# Patient Record
Sex: MALE | Race: WHITE | NOT HISPANIC OR LATINO | Employment: OTHER | RURAL
[De-identification: names, ages, dates, MRNs, and addresses within clinical notes are randomized per-mention and may not be internally consistent; named-entity substitution may affect disease eponyms.]

---

## 2017-01-04 RX ORDER — ISOSORBIDE MONONITRATE 30 MG/1
30 TABLET, EXTENDED RELEASE ORAL DAILY
Qty: 90 TABLET | Refills: 2 | Status: SHIPPED | OUTPATIENT
Start: 2017-01-04 | End: 2017-01-05 | Stop reason: SDUPTHER

## 2017-01-05 RX ORDER — NITROGLYCERIN 0.4 MG/1
0.4 TABLET SUBLINGUAL
Qty: 25 TABLET | Refills: 5 | Status: SHIPPED | OUTPATIENT
Start: 2017-01-05 | End: 2018-10-12 | Stop reason: SDUPTHER

## 2017-01-05 RX ORDER — ISOSORBIDE MONONITRATE 30 MG/1
30 TABLET, EXTENDED RELEASE ORAL DAILY
Qty: 90 TABLET | Refills: 2 | Status: SHIPPED | OUTPATIENT
Start: 2017-01-05 | End: 2017-10-16 | Stop reason: SDUPTHER

## 2017-03-09 PROBLEM — R07.89 ATYPICAL CHEST PAIN: Status: ACTIVE | Noted: 2017-03-09

## 2017-03-09 PROBLEM — Z72.0 TOBACCO USE: Status: ACTIVE | Noted: 2017-03-09

## 2017-03-27 RX ORDER — OMEPRAZOLE 20 MG/1
CAPSULE, DELAYED RELEASE ORAL
Qty: 90 CAPSULE | Refills: 0 | Status: SHIPPED | OUTPATIENT
Start: 2017-03-27 | End: 2017-06-26 | Stop reason: SDUPTHER

## 2017-05-18 ENCOUNTER — OFFICE VISIT (OUTPATIENT)
Dept: CARDIOLOGY | Facility: CLINIC | Age: 57
End: 2017-05-18

## 2017-05-18 VITALS
HEIGHT: 71 IN | DIASTOLIC BLOOD PRESSURE: 72 MMHG | SYSTOLIC BLOOD PRESSURE: 126 MMHG | HEART RATE: 70 BPM | WEIGHT: 190.4 LBS | BODY MASS INDEX: 26.65 KG/M2

## 2017-05-18 DIAGNOSIS — Z72.0 TOBACCO USE: ICD-10-CM

## 2017-05-18 DIAGNOSIS — I25.10 MULTIPLE VESSEL CORONARY ARTERY DISEASE: Primary | ICD-10-CM

## 2017-05-18 DIAGNOSIS — E78.5 DYSLIPIDEMIA: ICD-10-CM

## 2017-05-18 DIAGNOSIS — I10 ESSENTIAL HYPERTENSION: ICD-10-CM

## 2017-05-18 PROCEDURE — 99214 OFFICE O/P EST MOD 30 MIN: CPT | Performed by: NURSE PRACTITIONER

## 2017-05-18 RX ORDER — VARENICLINE TARTRATE 0.5 MG/1
0.5 TABLET, FILM COATED ORAL 2 TIMES DAILY
Qty: 30 TABLET | Refills: 0 | Status: SHIPPED | OUTPATIENT
Start: 2017-05-18 | End: 2017-12-07 | Stop reason: ALTCHOICE

## 2017-05-18 RX ORDER — VARENICLINE TARTRATE 1 MG/1
1 TABLET, FILM COATED ORAL 2 TIMES DAILY
Qty: 60 TABLET | Refills: 6 | Status: SHIPPED | OUTPATIENT
Start: 2017-05-18 | End: 2017-12-07 | Stop reason: ALTCHOICE

## 2017-05-19 ENCOUNTER — PREP FOR SURGERY (OUTPATIENT)
Dept: CARDIOLOGY | Facility: CLINIC | Age: 57
End: 2017-05-19

## 2017-05-19 DIAGNOSIS — I25.118 CORONARY ARTERY DISEASE INVOLVING NATIVE CORONARY ARTERY OF NATIVE HEART WITH OTHER FORM OF ANGINA PECTORIS (HCC): Primary | ICD-10-CM

## 2017-05-19 DIAGNOSIS — I25.119 CORONARY ARTERY DISEASE INVOLVING NATIVE CORONARY ARTERY OF NATIVE HEART WITH ANGINA PECTORIS (HCC): Primary | ICD-10-CM

## 2017-05-19 RX ORDER — ASPIRIN 81 MG/1
325 TABLET ORAL DAILY
Status: CANCELLED | OUTPATIENT
Start: 2017-05-20

## 2017-05-19 RX ORDER — NITROGLYCERIN 0.4 MG/1
0.4 TABLET SUBLINGUAL
Status: CANCELLED | OUTPATIENT
Start: 2017-05-19

## 2017-05-19 RX ORDER — ACETAMINOPHEN 325 MG/1
650 TABLET ORAL EVERY 4 HOURS PRN
Status: CANCELLED | OUTPATIENT
Start: 2017-05-19

## 2017-05-19 RX ORDER — SODIUM CHLORIDE 0.9 % (FLUSH) 0.9 %
1-10 SYRINGE (ML) INJECTION AS NEEDED
Status: CANCELLED | OUTPATIENT
Start: 2017-05-19

## 2017-05-19 RX ORDER — ASPIRIN 325 MG
325 TABLET ORAL ONCE
Status: CANCELLED | OUTPATIENT
Start: 2017-05-19 | End: 2017-05-19

## 2017-05-19 RX ORDER — ONDANSETRON 2 MG/ML
4 INJECTION INTRAMUSCULAR; INTRAVENOUS EVERY 6 HOURS PRN
Status: CANCELLED | OUTPATIENT
Start: 2017-05-19

## 2017-05-24 ENCOUNTER — RESULTS ENCOUNTER (OUTPATIENT)
Dept: CARDIOLOGY | Facility: CLINIC | Age: 57
End: 2017-05-24

## 2017-05-24 DIAGNOSIS — I25.119 CORONARY ARTERY DISEASE INVOLVING NATIVE CORONARY ARTERY OF NATIVE HEART WITH ANGINA PECTORIS (HCC): ICD-10-CM

## 2017-05-24 LAB
ALBUMIN SERPL-MCNC: 4.2 G/DL (ref 3.2–4.8)
ALBUMIN/GLOB SERPL: 1.4 G/DL (ref 1.5–2.5)
ALP SERPL-CCNC: 63 U/L (ref 25–100)
ALT SERPL-CCNC: 39 U/L (ref 7–40)
AST SERPL-CCNC: 37 U/L (ref 0–33)
BILIRUB SERPL-MCNC: 0.9 MG/DL (ref 0.3–1.2)
BUN SERPL-MCNC: 9 MG/DL (ref 9–23)
BUN/CREAT SERPL: 12.9 (ref 7–25)
CALCIUM SERPL-MCNC: 9.8 MG/DL (ref 8.7–10.4)
CHLORIDE SERPL-SCNC: 111 MMOL/L (ref 99–109)
CHOLEST SERPL-MCNC: 117 MG/DL (ref 0–200)
CO2 SERPL-SCNC: 28 MMOL/L (ref 20–31)
CREAT SERPL-MCNC: 0.7 MG/DL (ref 0.6–1.3)
ERYTHROCYTE [DISTWIDTH] IN BLOOD BY AUTOMATED COUNT: 12.5 % (ref 11.3–14.5)
GLOBULIN SER CALC-MCNC: 3 GM/DL
GLUCOSE SERPL-MCNC: 83 MG/DL (ref 70–100)
HCT VFR BLD AUTO: 43.5 % (ref 38.9–50.9)
HDLC SERPL-MCNC: 42 MG/DL (ref 40–60)
HGB BLD-MCNC: 14.8 G/DL (ref 13.1–17.5)
LDLC SERPL CALC-MCNC: 46 MG/DL (ref 0–100)
MCH RBC QN AUTO: 32.9 PG (ref 27–31)
MCHC RBC AUTO-ENTMCNC: 34 G/DL (ref 32–36)
MCV RBC AUTO: 96.7 FL (ref 80–99)
PLATELET # BLD AUTO: 236 10*3/MM3 (ref 150–450)
POTASSIUM SERPL-SCNC: 4 MMOL/L (ref 3.5–5.5)
PROT SERPL-MCNC: 7.2 G/DL (ref 5.7–8.2)
RBC # BLD AUTO: 4.5 10*6/MM3 (ref 4.2–5.76)
SODIUM SERPL-SCNC: 138 MMOL/L (ref 132–146)
TRIGL SERPL-MCNC: 145 MG/DL (ref 0–150)
VLDLC SERPL CALC-MCNC: 29 MG/DL
WBC # BLD AUTO: 7.65 10*3/MM3 (ref 3.5–10.8)

## 2017-05-24 RX ORDER — AMLODIPINE BESYLATE 5 MG/1
5 TABLET ORAL DAILY
Qty: 90 TABLET | Refills: 3 | Status: SHIPPED | OUTPATIENT
Start: 2017-05-24 | End: 2018-06-04 | Stop reason: SDUPTHER

## 2017-05-26 ENCOUNTER — HOSPITAL ENCOUNTER (OUTPATIENT)
Facility: HOSPITAL | Age: 57
Discharge: HOME | End: 2017-05-26
Attending: INTERNAL MEDICINE | Admitting: INTERNAL MEDICINE

## 2017-05-26 ENCOUNTER — APPOINTMENT (OUTPATIENT)
Dept: GENERAL RADIOLOGY | Facility: HOSPITAL | Age: 57
End: 2017-05-26

## 2017-05-26 VITALS
TEMPERATURE: 97.1 F | SYSTOLIC BLOOD PRESSURE: 124 MMHG | WEIGHT: 188.05 LBS | OXYGEN SATURATION: 94 % | RESPIRATION RATE: 18 BRPM | HEIGHT: 71 IN | DIASTOLIC BLOOD PRESSURE: 86 MMHG | HEART RATE: 59 BPM | BODY MASS INDEX: 26.33 KG/M2

## 2017-05-26 DIAGNOSIS — I25.10 MULTIPLE VESSEL CORONARY ARTERY DISEASE: ICD-10-CM

## 2017-05-26 DIAGNOSIS — I25.118 CORONARY ARTERY DISEASE INVOLVING NATIVE CORONARY ARTERY OF NATIVE HEART WITH OTHER FORM OF ANGINA PECTORIS (HCC): ICD-10-CM

## 2017-05-26 LAB
ACT BLD: 266 SECONDS (ref 82–152)
ACT BLD: 270 SECONDS (ref 82–152)

## 2017-05-26 PROCEDURE — C1725 CATH, TRANSLUMIN NON-LASER: HCPCS | Performed by: INTERNAL MEDICINE

## 2017-05-26 PROCEDURE — C1769 GUIDE WIRE: HCPCS | Performed by: INTERNAL MEDICINE

## 2017-05-26 PROCEDURE — C1894 INTRO/SHEATH, NON-LASER: HCPCS | Performed by: INTERNAL MEDICINE

## 2017-05-26 PROCEDURE — 25010000002 HEPARIN (PORCINE) PER 1000 UNITS: Performed by: INTERNAL MEDICINE

## 2017-05-26 PROCEDURE — 92928 PRQ TCAT PLMT NTRAC ST 1 LES: CPT | Performed by: INTERNAL MEDICINE

## 2017-05-26 PROCEDURE — 25010000002 FENTANYL CITRATE (PF) 100 MCG/2ML SOLUTION: Performed by: INTERNAL MEDICINE

## 2017-05-26 PROCEDURE — C1874 STENT, COATED/COV W/DEL SYS: HCPCS | Performed by: INTERNAL MEDICINE

## 2017-05-26 PROCEDURE — 93458 L HRT ARTERY/VENTRICLE ANGIO: CPT | Performed by: INTERNAL MEDICINE

## 2017-05-26 PROCEDURE — 0 IOPAMIDOL PER 1 ML: Performed by: INTERNAL MEDICINE

## 2017-05-26 PROCEDURE — C1753 CATH, INTRAVAS ULTRASOUND: HCPCS | Performed by: INTERNAL MEDICINE

## 2017-05-26 PROCEDURE — 25010000002 MIDAZOLAM PER 1 MG: Performed by: INTERNAL MEDICINE

## 2017-05-26 PROCEDURE — 71010 HC CHEST PA OR AP: CPT

## 2017-05-26 PROCEDURE — 85347 COAGULATION TIME ACTIVATED: CPT

## 2017-05-26 PROCEDURE — 92978 ENDOLUMINL IVUS OCT C 1ST: CPT | Performed by: INTERNAL MEDICINE

## 2017-05-26 PROCEDURE — C1887 CATHETER, GUIDING: HCPCS | Performed by: INTERNAL MEDICINE

## 2017-05-26 PROCEDURE — C9600 PERC DRUG-EL COR STENT SING: HCPCS | Performed by: INTERNAL MEDICINE

## 2017-05-26 DEVICE — XIENCE V EVEROLIMUS ELUTING CORONARY STENT SYSTEM  2.50 MM X 15 MM / RAPID-EXCHANGE
Type: IMPLANTABLE DEVICE | Status: FUNCTIONAL
Brand: XIENCE V

## 2017-05-26 RX ORDER — NITROGLYCERIN 0.4 MG/1
0.4 TABLET SUBLINGUAL
Status: DISCONTINUED | OUTPATIENT
Start: 2017-05-26 | End: 2017-05-26 | Stop reason: HOSPADM

## 2017-05-26 RX ORDER — NALOXONE HCL 0.4 MG/ML
0.4 VIAL (ML) INJECTION
Status: DISCONTINUED | OUTPATIENT
Start: 2017-05-26 | End: 2017-05-26 | Stop reason: HOSPADM

## 2017-05-26 RX ORDER — MIDAZOLAM HYDROCHLORIDE 1 MG/ML
INJECTION INTRAMUSCULAR; INTRAVENOUS AS NEEDED
Status: DISCONTINUED | OUTPATIENT
Start: 2017-05-26 | End: 2017-05-26 | Stop reason: HOSPADM

## 2017-05-26 RX ORDER — SODIUM CHLORIDE 9 MG/ML
1-3 INJECTION, SOLUTION INTRAVENOUS CONTINUOUS
Status: DISCONTINUED | OUTPATIENT
Start: 2017-05-26 | End: 2017-05-26 | Stop reason: HOSPADM

## 2017-05-26 RX ORDER — ASPIRIN 325 MG
325 TABLET, DELAYED RELEASE (ENTERIC COATED) ORAL DAILY
Status: DISCONTINUED | OUTPATIENT
Start: 2017-05-27 | End: 2017-05-26 | Stop reason: HOSPADM

## 2017-05-26 RX ORDER — CLOPIDOGREL BISULFATE 75 MG/1
75 TABLET ORAL DAILY
Status: DISCONTINUED | OUTPATIENT
Start: 2017-05-27 | End: 2017-05-26 | Stop reason: HOSPADM

## 2017-05-26 RX ORDER — TEMAZEPAM 7.5 MG/1
7.5 CAPSULE ORAL NIGHTLY PRN
Status: DISCONTINUED | OUTPATIENT
Start: 2017-05-26 | End: 2017-05-26 | Stop reason: HOSPADM

## 2017-05-26 RX ORDER — ASPIRIN 325 MG
325 TABLET, DELAYED RELEASE (ENTERIC COATED) ORAL DAILY
Status: DISCONTINUED | OUTPATIENT
Start: 2017-05-26 | End: 2017-05-26 | Stop reason: SDUPTHER

## 2017-05-26 RX ORDER — SENNA AND DOCUSATE SODIUM 50; 8.6 MG/1; MG/1
2 TABLET, FILM COATED ORAL NIGHTLY
Status: DISCONTINUED | OUTPATIENT
Start: 2017-05-26 | End: 2017-05-26 | Stop reason: HOSPADM

## 2017-05-26 RX ORDER — CLOPIDOGREL BISULFATE 75 MG/1
75 TABLET ORAL DAILY
Qty: 90 TABLET | Refills: 3 | Status: SHIPPED | OUTPATIENT
Start: 2017-05-26 | End: 2018-06-04 | Stop reason: SDUPTHER

## 2017-05-26 RX ORDER — DOCUSATE SODIUM 100 MG/1
100 CAPSULE, LIQUID FILLED ORAL 2 TIMES DAILY
Status: DISCONTINUED | OUTPATIENT
Start: 2017-05-26 | End: 2017-05-26 | Stop reason: HOSPADM

## 2017-05-26 RX ORDER — ASPIRIN 325 MG
325 TABLET ORAL ONCE
Status: COMPLETED | OUTPATIENT
Start: 2017-05-26 | End: 2017-05-26

## 2017-05-26 RX ORDER — HEPARIN SODIUM 1000 [USP'U]/ML
INJECTION, SOLUTION INTRAVENOUS; SUBCUTANEOUS AS NEEDED
Status: DISCONTINUED | OUTPATIENT
Start: 2017-05-26 | End: 2017-05-26 | Stop reason: HOSPADM

## 2017-05-26 RX ORDER — ONDANSETRON 2 MG/ML
4 INJECTION INTRAMUSCULAR; INTRAVENOUS EVERY 6 HOURS PRN
Status: DISCONTINUED | OUTPATIENT
Start: 2017-05-26 | End: 2017-05-26 | Stop reason: HOSPADM

## 2017-05-26 RX ORDER — HYDROCODONE BITARTRATE AND ACETAMINOPHEN 7.5; 325 MG/1; MG/1
1 TABLET ORAL EVERY 4 HOURS PRN
Status: DISCONTINUED | OUTPATIENT
Start: 2017-05-26 | End: 2017-05-26 | Stop reason: HOSPADM

## 2017-05-26 RX ORDER — SODIUM CHLORIDE 9 MG/ML
100 INJECTION, SOLUTION INTRAVENOUS CONTINUOUS
Status: ACTIVE | OUTPATIENT
Start: 2017-05-26 | End: 2017-05-26

## 2017-05-26 RX ORDER — LIDOCAINE HYDROCHLORIDE 10 MG/ML
INJECTION, SOLUTION INFILTRATION; PERINEURAL AS NEEDED
Status: DISCONTINUED | OUTPATIENT
Start: 2017-05-26 | End: 2017-05-26 | Stop reason: HOSPADM

## 2017-05-26 RX ORDER — FENTANYL CITRATE 50 UG/ML
INJECTION, SOLUTION INTRAMUSCULAR; INTRAVENOUS AS NEEDED
Status: DISCONTINUED | OUTPATIENT
Start: 2017-05-26 | End: 2017-05-26 | Stop reason: HOSPADM

## 2017-05-26 RX ORDER — BISACODYL 10 MG
10 SUPPOSITORY, RECTAL RECTAL DAILY PRN
Status: DISCONTINUED | OUTPATIENT
Start: 2017-05-26 | End: 2017-05-26 | Stop reason: HOSPADM

## 2017-05-26 RX ORDER — ACETAMINOPHEN 325 MG/1
650 TABLET ORAL EVERY 4 HOURS PRN
Status: DISCONTINUED | OUTPATIENT
Start: 2017-05-26 | End: 2017-05-26 | Stop reason: HOSPADM

## 2017-05-26 RX ORDER — SODIUM CHLORIDE 0.9 % (FLUSH) 0.9 %
1-10 SYRINGE (ML) INJECTION AS NEEDED
Status: DISCONTINUED | OUTPATIENT
Start: 2017-05-26 | End: 2017-05-26 | Stop reason: HOSPADM

## 2017-05-26 RX ORDER — CLOPIDOGREL BISULFATE 75 MG/1
600 TABLET ORAL ONCE
Status: DISCONTINUED | OUTPATIENT
Start: 2017-05-26 | End: 2017-05-26 | Stop reason: HOSPADM

## 2017-05-26 RX ORDER — CLOPIDOGREL BISULFATE 75 MG/1
TABLET ORAL AS NEEDED
Status: DISCONTINUED | OUTPATIENT
Start: 2017-05-26 | End: 2017-05-26 | Stop reason: HOSPADM

## 2017-05-26 RX ORDER — POLYETHYLENE GLYCOL 3350 17 G/17G
17 POWDER, FOR SOLUTION ORAL DAILY
Status: DISCONTINUED | OUTPATIENT
Start: 2017-05-26 | End: 2017-05-26 | Stop reason: HOSPADM

## 2017-05-26 RX ORDER — MORPHINE SULFATE 4 MG/ML
1 INJECTION, SOLUTION INTRAMUSCULAR; INTRAVENOUS EVERY 4 HOURS PRN
Status: DISCONTINUED | OUTPATIENT
Start: 2017-05-26 | End: 2017-05-26 | Stop reason: HOSPADM

## 2017-05-26 RX ADMIN — ASPIRIN 325 MG ORAL TABLET 325 MG: 325 PILL ORAL at 06:59

## 2017-05-26 RX ADMIN — SODIUM CHLORIDE 3 ML/KG/HR: 9 INJECTION, SOLUTION INTRAVENOUS at 07:01

## 2017-05-31 ENCOUNTER — OFFICE VISIT (OUTPATIENT)
Dept: INTERNAL MEDICINE | Facility: CLINIC | Age: 57
End: 2017-05-31

## 2017-05-31 VITALS
BODY MASS INDEX: 26.74 KG/M2 | HEART RATE: 76 BPM | DIASTOLIC BLOOD PRESSURE: 70 MMHG | WEIGHT: 191 LBS | TEMPERATURE: 98 F | SYSTOLIC BLOOD PRESSURE: 115 MMHG | HEIGHT: 71 IN | OXYGEN SATURATION: 96 %

## 2017-05-31 DIAGNOSIS — F41.9 ANXIETY: Primary | ICD-10-CM

## 2017-05-31 DIAGNOSIS — I25.10 MULTIPLE VESSEL CORONARY ARTERY DISEASE: ICD-10-CM

## 2017-05-31 DIAGNOSIS — K21.9 GASTROESOPHAGEAL REFLUX DISEASE, ESOPHAGITIS PRESENCE NOT SPECIFIED: ICD-10-CM

## 2017-05-31 DIAGNOSIS — I10 ESSENTIAL HYPERTENSION: ICD-10-CM

## 2017-05-31 PROBLEM — R07.89 ATYPICAL CHEST PAIN: Status: RESOLVED | Noted: 2017-03-09 | Resolved: 2017-05-31

## 2017-05-31 PROCEDURE — 96160 PT-FOCUSED HLTH RISK ASSMT: CPT | Performed by: INTERNAL MEDICINE

## 2017-05-31 PROCEDURE — 99396 PREV VISIT EST AGE 40-64: CPT | Performed by: INTERNAL MEDICINE

## 2017-05-31 PROCEDURE — G0439 PPPS, SUBSEQ VISIT: HCPCS | Performed by: INTERNAL MEDICINE

## 2017-05-31 RX ORDER — ESCITALOPRAM OXALATE 10 MG/1
10 TABLET ORAL DAILY
Qty: 30 TABLET | Refills: 2 | Status: SHIPPED | OUTPATIENT
Start: 2017-05-31 | End: 2017-07-04 | Stop reason: SDUPTHER

## 2017-06-26 ENCOUNTER — OFFICE VISIT (OUTPATIENT)
Dept: INTERNAL MEDICINE | Facility: CLINIC | Age: 57
End: 2017-06-26

## 2017-06-26 VITALS
DIASTOLIC BLOOD PRESSURE: 71 MMHG | OXYGEN SATURATION: 96 % | WEIGHT: 189.13 LBS | SYSTOLIC BLOOD PRESSURE: 130 MMHG | HEIGHT: 71 IN | TEMPERATURE: 98.2 F | BODY MASS INDEX: 26.48 KG/M2 | HEART RATE: 69 BPM

## 2017-06-26 DIAGNOSIS — G47.09 OTHER INSOMNIA: ICD-10-CM

## 2017-06-26 DIAGNOSIS — F41.9 ANXIETY: Primary | ICD-10-CM

## 2017-06-26 DIAGNOSIS — I25.10 MULTIPLE VESSEL CORONARY ARTERY DISEASE: ICD-10-CM

## 2017-06-26 DIAGNOSIS — I10 ESSENTIAL HYPERTENSION: ICD-10-CM

## 2017-06-26 PROCEDURE — 99214 OFFICE O/P EST MOD 30 MIN: CPT | Performed by: INTERNAL MEDICINE

## 2017-06-26 RX ORDER — OMEPRAZOLE 20 MG/1
CAPSULE, DELAYED RELEASE ORAL
Qty: 30 CAPSULE | Refills: 0 | Status: SHIPPED | OUTPATIENT
Start: 2017-06-26 | End: 2017-07-04 | Stop reason: SDUPTHER

## 2017-06-26 RX ORDER — TEMAZEPAM 15 MG/1
15 CAPSULE ORAL NIGHTLY PRN
Qty: 30 CAPSULE | Refills: 3 | Status: SHIPPED | OUTPATIENT
Start: 2017-06-26 | End: 2017-09-28 | Stop reason: SDUPTHER

## 2017-06-26 NOTE — PROGRESS NOTES
Subjective  Johnny Crow is a 57 y.o. male    HPI coming in for follow-up patient with history of coronary artery disease has significantly cut down on his tobacco use history of anxiety with depression for which we had put him on Lexapro he appears to be tolerating this well and appears to be doing significantly better.  history of insomnia. May drink up to 5 beers in the evening    The following portions of the patient's history were reviewed and updated as appropriate: allergies, current medications, past family history, past medical history, past social history, past surgical history, and problem list.     Review of Systems   Constitutional: Negative.  Negative for activity change, appetite change, fatigue and fever.   HENT: Negative for congestion, ear discharge, ear pain and trouble swallowing.    Eyes: Negative for photophobia and visual disturbance.   Respiratory: Positive for cough. Negative for shortness of breath.    Cardiovascular: Negative for chest pain and palpitations.   Gastrointestinal: Negative for abdominal distention, abdominal pain, constipation, diarrhea, nausea and vomiting.   Endocrine: Negative.    Genitourinary: Negative for dysuria, hematuria and urgency.   Musculoskeletal: Negative for arthralgias, back pain, joint swelling and myalgias.   Skin: Negative for color change and rash.   Allergic/Immunologic: Negative.    Neurological: Negative for dizziness, weakness, light-headedness and headaches.   Hematological: Negative for adenopathy. Does not bruise/bleed easily.   Psychiatric/Behavioral: Positive for sleep disturbance. Negative for agitation, confusion and dysphoric mood. The patient is nervous/anxious.        Vitals:    06/26/17 1030   BP: 130/71   Pulse: 69   Temp: 98.2 °F (36.8 °C)   SpO2: 96%       Objective  Physical Exam   Constitutional: He is oriented to person, place, and time. He appears well-developed and well-nourished. No distress.   HENT:   Nose: Nose normal.    Mouth/Throat: Oropharynx is clear and moist.   Eyes: Conjunctivae and EOM are normal. No scleral icterus.   Neck: No tracheal deviation present. No thyromegaly present.   Cardiovascular: Normal rate and regular rhythm.  Exam reveals no friction rub.    No murmur heard.  Pulmonary/Chest: No respiratory distress. He has no wheezes. He has no rales.   Abdominal: Soft. He exhibits no distension and no mass. There is no tenderness. There is no guarding.   Musculoskeletal: Normal range of motion. He exhibits no deformity.   Lymphadenopathy:     He has no cervical adenopathy.   Neurological: He is alert and oriented to person, place, and time. He has normal reflexes. No cranial nerve deficit. Coordination normal.   Skin: Skin is warm and dry. No rash noted. No erythema.   Psychiatric: He has a normal mood and affect. His behavior is normal. Judgment and thought content normal.       Diagnoses and all orders for this visit:    Anxiety better counseled about sleep hygiene advised to cut down on alcohol use    Essential hypertension stable with current meds and low-salt diet    Multiple vessel coronary artery disease angina free counseled about complete cessation of tobacco continue with him door along with statins  Temazepam when necessary at nighttime counseled about addictive nature of the medication

## 2017-06-29 RX ORDER — SIMVASTATIN 80 MG
TABLET ORAL
Qty: 90 TABLET | Refills: 1 | Status: SHIPPED | OUTPATIENT
Start: 2017-06-29 | End: 2017-11-17 | Stop reason: SDUPTHER

## 2017-07-05 RX ORDER — ESCITALOPRAM OXALATE 10 MG/1
TABLET ORAL
Qty: 30 TABLET | Refills: 5 | Status: SHIPPED | OUTPATIENT
Start: 2017-07-05 | End: 2018-01-04 | Stop reason: SDUPTHER

## 2017-07-06 RX ORDER — OMEPRAZOLE 20 MG/1
CAPSULE, DELAYED RELEASE ORAL
Qty: 90 CAPSULE | Refills: 2 | Status: SHIPPED | OUTPATIENT
Start: 2017-07-06 | End: 2018-05-03 | Stop reason: SDUPTHER

## 2017-07-18 ENCOUNTER — OFFICE VISIT (OUTPATIENT)
Dept: CARDIOLOGY | Facility: CLINIC | Age: 57
End: 2017-07-18

## 2017-07-18 VITALS
WEIGHT: 188 LBS | BODY MASS INDEX: 26.32 KG/M2 | DIASTOLIC BLOOD PRESSURE: 70 MMHG | SYSTOLIC BLOOD PRESSURE: 144 MMHG | HEART RATE: 64 BPM | HEIGHT: 71 IN

## 2017-07-18 DIAGNOSIS — I25.10 MULTIPLE VESSEL CORONARY ARTERY DISEASE: Primary | ICD-10-CM

## 2017-07-18 DIAGNOSIS — I10 ESSENTIAL HYPERTENSION: ICD-10-CM

## 2017-07-18 DIAGNOSIS — E78.5 DYSLIPIDEMIA: ICD-10-CM

## 2017-07-18 PROCEDURE — 99213 OFFICE O/P EST LOW 20 MIN: CPT | Performed by: INTERNAL MEDICINE

## 2017-07-18 NOTE — PROGRESS NOTES
Johnny Crow  1960  [phone]      07/18/2017    Sagar Cid MD    Chief Complaint   Patient presents with   • Coronary Artery Disease   • Hypertension     PROBLEM LIST:  1. Coronary artery disease:  a. Non-ST-elevation MI, 09/11/2006.  b. LHC, 09/12/2006, with primary stenting of the proximal and mid  right coronary artery using a 2.5 X 18 mm drug-eluting Cypher stent; 80% circumflex obtuse marginal subsegment ostial stenosis too small for intervention; 50% mid to distal circumflex stenosis; LAD maximal stenosis of 50%.   c. Cardiolite GXT, 06/26/2008:   Abnormal Cardiolite GXT with an area of stress-induced basal and mid posterolateral hypoperfusion.  LVEF 50% with mild inferobasilar hypokinesis.    d. Left heart catheterization, 06/27/2008, Dr. Irwin Nascimento:  LVEF 55% to 60% with total occlusion of  a small superior branch of the first OM with late filling, nonobstructive coronary disease in the first diagonal.   e. Repeat Cardiolite GXT for chest pain, 01/23/2009:  Exercise duration of 12 minutes with no chest discomfort.  An area of mild stress-induced  basal and mid posterolateral hypoperfusion was seen identical to the previous findings from June of 2008 at which time cardiac catheterization showed no significant findings.  LVEF 56%.  f. Echocardiogram by Dr. Calvillo, 12/16/2009: Exercise duration  of 12 minutes and 20 seconds with a maximal systolic pressure of 202 mmHg and mid and basilar septal and inferior hypokinesis on post exercise images.  g. Left heart catheterization, 05/07/2010:  40% RCA stenosis with an occluded third obtuse marginal  branch felt responsible for the patient’s abnormal stress test.  Normal LV systolic function.  h. Recurrent angina with negative gallbladder ultrasound, December 2012.  i. Cardiac catheterization, 12/21/2012, Anai Sweet MD, revealing  a widely patent RCA stent, normal LV systolic function and wall motion, and a 40% to 50% stenosis of the  second diagonal branch.  j. Cardiac catheterization, 5/26/2017: Successful but complex PTCA/stent placement in the ostial circumflex using a 2.5 x 15 mm Alpine drug-eluting stent. Residual diagonal disease, occluded obtuse marginal which is an old finding. Patent right coronary with disease distal to the right coronary which does not appear critical. Mildly reduced LV systolic function with lateral hypokinesis prior to intervention.  2. Tobacco use with cessation, 07/07/2014.  3. Dyslipidemia.   4. Hypertension.  5. Leg pain:  a.  Normal KIMMY, 09/13/2006, at [facility].    6. Status  post polypectomy x2, noncancerous, spring 2006.  7. Spontaneous pneumothorax x2.   8. Bilateral lung nodules, left greater than right, currently under treatment by the [facility].    No Known Allergies    Current Medications:      Current Outpatient Prescriptions:   •  amLODIPine (NORVASC) 5 MG tablet, Take 1 tablet by mouth Daily., Disp: 90 tablet, Rfl: 3  •  aspirin 325 MG tablet, Take 1 tablet by mouth daily., Disp: , Rfl:   •  clopidogrel (PLAVIX) 75 MG tablet, Take 1 tablet by mouth Daily., Disp: 90 tablet, Rfl: 3  •  escitalopram (LEXAPRO) 10 MG tablet, take 1 tablet by mouth once daily, Disp: 30 tablet, Rfl: 5  •  isosorbide mononitrate (IMDUR) 30 MG 24 hr tablet, Take 1 tablet by mouth Daily., Disp: 90 tablet, Rfl: 2  •  metoprolol succinate XL (TOPROL-XL) 50 MG 24 hr tablet, Take 1 tablet by mouth Daily., Disp: 90 tablet, Rfl: 2  •  nicotine (NICOTROL) 10 MG inhaler, Inhale 1 puff As Needed for Smoking Cessation., Disp: 168 each, Rfl: 0  •  nitroglycerin (NITROSTAT) 0.4 MG SL tablet, Place 1 tablet under the tongue Every 5 (Five) Minutes As Needed for chest pain., Disp: 25 tablet, Rfl: 5  •  omeprazole (priLOSEC) 20 MG capsule, take 1 capsule by mouth once daily, Disp: 90 capsule, Rfl: 2  •  simvastatin (ZOCOR) 80 MG tablet, take 1 tablet by mouth once daily, Disp: 90 tablet, Rfl: 1  •  temazepam (RESTORIL) 15 MG  "capsule, Take 1 capsule by mouth At Night As Needed for Sleep., Disp: 30 capsule, Rfl: 3  •  varenicline (CHANTIX CONTINUING MONTH [place]) 1 MG tablet, Take 1 tablet by mouth 2 (Two) Times a Day., Disp: 60 tablet, Rfl: 6  •  varenicline (CHANTIX) 0.5 MG tablet, Take 1 tablet by mouth 2 (Two) Times a Day., Disp: 30 tablet, Rfl: 0    HPI    Mr. Crow presents today for 6 week hospital follow up status post cardiac catheterization. Patient has a history of coronary artery disease, hypertension, and dyslipidemia. Since last visit, patient has been feeling feeling well overall from a cardiovascular standpoint. He notes that the first week after the cath, he was very weak. After that, he states he went on a few 4 day golf trips, playing 36 holes per day, and did so with no issues. He monitors his blood pressure at home and notes it has been running within normal limits. Patient denies chest pain, palpitations, shortness of breath, edema, PND, orthopnea, dizziness, and syncope. He has not ceased smoking yet but is actively trying to stop. He is currently not doing much in terms of exercise, but is trying to remain active doing things such as playing golf and working in the yard.    The following portions of the patient's history were reviewed and updated as appropriate: allergies, current medications and problem list.    Pertinent positives as listed in the HPI.  All other systems reviewed are negative.    Vitals:    07/18/17 1044   BP: 144/70   BP Location: Left arm   Patient Position: Sitting   Pulse: 64   Weight: 188 lb (85.3 kg)   Height: 71\" (180.3 cm)       Physical Exam:  General: Alert and oriented  Neck: Jugular venous pressure is within normal limits. Carotids have normal upstrokes without bruits.   Cardiovascular: Regular rate and rhythm without murmur gallop or rub.  Lungs: Clear without rales or wheezes. Equal expansion is noted.   Extremities: Show no edema.  Skin: warm and dry.  Neurologic: " nonfocal    Diagnostic Data:    Lab Results   Component Value Date    TRIG 145 05/23/2017    HDL 42 05/23/2017    LDLDIRECT 65 11/12/2015    AST 37 (H) 05/23/2017    ALT 39 05/23/2017     Lab Results   Component Value Date    GLUCOSE 96 08/30/2016    BUN 9 05/23/2017    CREATININE 0.70 05/23/2017    EGFRIFNONA 117 05/23/2017    EGFRIFAFRI 141 05/23/2017    BCR 12.9 05/23/2017    K 4.0 05/23/2017    CO2 28.0 05/23/2017    CALCIUM 9.8 05/23/2017    PROTENTOTREF 7.2 05/23/2017    ALBUMIN 4.20 05/23/2017    LABIL2 1.4 (L) 05/23/2017    AST 37 (H) 05/23/2017    ALT 39 05/23/2017     Lab Results   Component Value Date    WBC 7.65 05/23/2017    HGB 14.8 05/23/2017    HCT 43.5 05/23/2017    MCV 96.7 05/23/2017     05/23/2017       Procedures    Assessment:      ICD-10-CM ICD-9-CM   1. Multiple vessel coronary artery disease I25.10 414.00   2. Essential hypertension I10 401.9   3. Dyslipidemia E78.5 272.4       Plan:    1. Continue efforts at smoking cessation.   2. Begin routine exercise regimen; 4-5 days per week, 30 minutes at a time.  3. Continue current medications.  Plavix for 1 yr p stent placement.  4. F/up in 4 months or sooner if needed.    Scribed for Anai Sweet MD by Jaime Cardenas. 7/18/2017  11:05 AM     I Anai Sweet MD personally performed the services described in this documentation as scribed by the above individual in my presence, and it is both accurate and complete.    Anai Sweet MD, FACC

## 2017-08-21 RX ORDER — METOPROLOL SUCCINATE 50 MG/1
TABLET, EXTENDED RELEASE ORAL
Qty: 90 TABLET | Refills: 3 | Status: SHIPPED | OUTPATIENT
Start: 2017-08-21 | End: 2018-02-15 | Stop reason: SDUPTHER

## 2017-09-04 ENCOUNTER — HOSPITAL ENCOUNTER (EMERGENCY)
Facility: HOSPITAL | Age: 57
Discharge: HOME | End: 2017-09-04
Attending: EMERGENCY MEDICINE | Admitting: EMERGENCY MEDICINE

## 2017-09-04 VITALS
SYSTOLIC BLOOD PRESSURE: 132 MMHG | HEIGHT: 71 IN | HEART RATE: 70 BPM | WEIGHT: 186 LBS | OXYGEN SATURATION: 93 % | TEMPERATURE: 98 F | BODY MASS INDEX: 26.04 KG/M2 | DIASTOLIC BLOOD PRESSURE: 72 MMHG | RESPIRATION RATE: 20 BRPM

## 2017-09-04 DIAGNOSIS — E86.0 DEHYDRATION: ICD-10-CM

## 2017-09-04 DIAGNOSIS — R74.8 ELEVATED LIPASE: ICD-10-CM

## 2017-09-04 DIAGNOSIS — F10.10 ALCOHOL ABUSE: Primary | ICD-10-CM

## 2017-09-04 LAB
ALBUMIN SERPL-MCNC: 4.2 G/DL (ref 3.5–5)
ALBUMIN/GLOB SERPL: 1.2 G/DL (ref 1–2)
ALP SERPL-CCNC: 71 U/L (ref 38–126)
ALT SERPL W P-5'-P-CCNC: 62 U/L (ref 13–69)
ANION GAP SERPL CALCULATED.3IONS-SCNC: 13.9 MMOL/L
AST SERPL-CCNC: 83 U/L (ref 15–46)
BASOPHILS # BLD AUTO: 0.06 10*3/MM3 (ref 0–0.2)
BASOPHILS NFR BLD AUTO: 0.9 % (ref 0–2.5)
BILIRUB SERPL-MCNC: 0.5 MG/DL (ref 0.2–1.3)
BILIRUB UR QL STRIP: NEGATIVE
BUN BLD-MCNC: 5 MG/DL (ref 7–20)
BUN/CREAT SERPL: 8.3 (ref 6.3–21.9)
CALCIUM SPEC-SCNC: 9.4 MG/DL (ref 8.4–10.2)
CHLORIDE SERPL-SCNC: 108 MMOL/L (ref 98–107)
CK SERPL-CCNC: 2022 U/L (ref 30–170)
CLARITY UR: CLEAR
CO2 SERPL-SCNC: 25 MMOL/L (ref 26–30)
COLOR UR: YELLOW
CREAT BLD-MCNC: 0.6 MG/DL (ref 0.6–1.3)
DEPRECATED RDW RBC AUTO: 41.6 FL (ref 37–54)
EOSINOPHIL # BLD AUTO: 0.13 10*3/MM3 (ref 0–0.7)
EOSINOPHIL NFR BLD AUTO: 1.9 % (ref 0–7)
ERYTHROCYTE [DISTWIDTH] IN BLOOD BY AUTOMATED COUNT: 12 % (ref 11.5–14.5)
GFR SERPL CREATININE-BSD FRML MDRD: 139 ML/MIN/1.73
GLOBULIN UR ELPH-MCNC: 3.4 GM/DL
GLUCOSE BLD-MCNC: 107 MG/DL (ref 74–98)
GLUCOSE UR STRIP-MCNC: ABNORMAL MG/DL
HCT VFR BLD AUTO: 40.6 % (ref 42–52)
HGB BLD-MCNC: 14.3 G/DL (ref 14–18)
HGB UR QL STRIP.AUTO: NEGATIVE
HOLD SPECIMEN: NORMAL
HOLD SPECIMEN: NORMAL
IMM GRANULOCYTES # BLD: 0.02 10*3/MM3 (ref 0–0.06)
IMM GRANULOCYTES NFR BLD: 0.3 % (ref 0–0.6)
KETONES UR QL STRIP: NEGATIVE
LEUKOCYTE ESTERASE UR QL STRIP.AUTO: NEGATIVE
LIPASE SERPL-CCNC: 364 U/L (ref 23–300)
LYMPHOCYTES # BLD AUTO: 1.53 10*3/MM3 (ref 0.6–3.4)
LYMPHOCYTES NFR BLD AUTO: 22.1 % (ref 10–50)
MCH RBC QN AUTO: 33 PG (ref 27–31)
MCHC RBC AUTO-ENTMCNC: 35.2 G/DL (ref 30–37)
MCV RBC AUTO: 93.8 FL (ref 80–94)
MONOCYTES # BLD AUTO: 0.57 10*3/MM3 (ref 0–0.9)
MONOCYTES NFR BLD AUTO: 8.2 % (ref 0–12)
NEUTROPHILS # BLD AUTO: 4.61 10*3/MM3 (ref 2–6.9)
NEUTROPHILS NFR BLD AUTO: 66.6 % (ref 37–80)
NITRITE UR QL STRIP: NEGATIVE
NRBC BLD MANUAL-RTO: 0 /100 WBC (ref 0–0)
PH UR STRIP.AUTO: 6.5 [PH] (ref 5–8)
PLATELET # BLD AUTO: 230 10*3/MM3 (ref 130–400)
PMV BLD AUTO: 9.4 FL (ref 6–12)
POTASSIUM BLD-SCNC: 3.9 MMOL/L (ref 3.5–5.1)
PROT SERPL-MCNC: 7.6 G/DL (ref 6.3–8.2)
PROT UR QL STRIP: NEGATIVE
RBC # BLD AUTO: 4.33 10*6/MM3 (ref 4.7–6.1)
SODIUM BLD-SCNC: 143 MMOL/L (ref 137–145)
SP GR UR STRIP: <=1.005 (ref 1–1.03)
UROBILINOGEN UR QL STRIP: ABNORMAL
WBC NRBC COR # BLD: 6.92 10*3/MM3 (ref 4.8–10.8)
WHOLE BLOOD HOLD SPECIMEN: NORMAL
WHOLE BLOOD HOLD SPECIMEN: NORMAL

## 2017-09-04 PROCEDURE — 85025 COMPLETE CBC W/AUTO DIFF WBC: CPT | Performed by: EMERGENCY MEDICINE

## 2017-09-04 PROCEDURE — 96374 THER/PROPH/DIAG INJ IV PUSH: CPT

## 2017-09-04 PROCEDURE — 80053 COMPREHEN METABOLIC PANEL: CPT | Performed by: EMERGENCY MEDICINE

## 2017-09-04 PROCEDURE — 99284 EMERGENCY DEPT VISIT MOD MDM: CPT

## 2017-09-04 PROCEDURE — 25010000002 DIAZEPAM PER 5 MG: Performed by: EMERGENCY MEDICINE

## 2017-09-04 PROCEDURE — 82550 ASSAY OF CK (CPK): CPT | Performed by: EMERGENCY MEDICINE

## 2017-09-04 PROCEDURE — 96361 HYDRATE IV INFUSION ADD-ON: CPT

## 2017-09-04 PROCEDURE — 81003 URINALYSIS AUTO W/O SCOPE: CPT

## 2017-09-04 PROCEDURE — 83690 ASSAY OF LIPASE: CPT | Performed by: EMERGENCY MEDICINE

## 2017-09-04 RX ORDER — SODIUM CHLORIDE, SODIUM LACTATE, POTASSIUM CHLORIDE, CALCIUM CHLORIDE 600; 310; 30; 20 MG/100ML; MG/100ML; MG/100ML; MG/100ML
1000 INJECTION, SOLUTION INTRAVENOUS ONCE
Status: COMPLETED | OUTPATIENT
Start: 2017-09-04 | End: 2017-09-04

## 2017-09-04 RX ORDER — DIAZEPAM 5 MG/ML
5 INJECTION, SOLUTION INTRAMUSCULAR; INTRAVENOUS ONCE
Status: COMPLETED | OUTPATIENT
Start: 2017-09-04 | End: 2017-09-04

## 2017-09-04 RX ORDER — HYDROCODONE BITARTRATE AND ACETAMINOPHEN 5; 325 MG/1; MG/1
1 TABLET ORAL EVERY 6 HOURS PRN
Qty: 10 TABLET | Refills: 0 | Status: SHIPPED | OUTPATIENT
Start: 2017-09-04 | End: 2017-12-07 | Stop reason: ALTCHOICE

## 2017-09-04 RX ORDER — ONDANSETRON 4 MG/1
4 TABLET, FILM COATED ORAL EVERY 6 HOURS PRN
Qty: 10 TABLET | Refills: 0 | Status: SHIPPED | OUTPATIENT
Start: 2017-09-04 | End: 2017-12-07 | Stop reason: ALTCHOICE

## 2017-09-04 RX ORDER — SODIUM CHLORIDE 0.9 % (FLUSH) 0.9 %
10 SYRINGE (ML) INJECTION AS NEEDED
Status: DISCONTINUED | OUTPATIENT
Start: 2017-09-04 | End: 2017-09-04 | Stop reason: HOSPADM

## 2017-09-04 RX ADMIN — SODIUM CHLORIDE 1000 ML: 9 INJECTION, SOLUTION INTRAVENOUS at 15:26

## 2017-09-04 RX ADMIN — DIAZEPAM 5 MG: 5 INJECTION, SOLUTION INTRAMUSCULAR; INTRAVENOUS at 15:27

## 2017-09-04 RX ADMIN — SODIUM CHLORIDE, POTASSIUM CHLORIDE, SODIUM LACTATE AND CALCIUM CHLORIDE 1000 ML: 600; 310; 30; 20 INJECTION, SOLUTION INTRAVENOUS at 16:45

## 2017-09-04 NOTE — ED PROVIDER NOTES
"Subjective   HPI Comments: 57-year-old male presenting with \"I think I drank too much\".  He states that for the last 4 days has been drinking a large amount of alcohol, has been in the sun and playing golf and has been drinking very little else.  Today he began to have diffuse body cramps and felt very unwell.  He has been somewhat nauseous as well.  He denies any fevers, chills, chest pain, abdominal pain, vomiting, diarrhea.  He usually only drinks one 12 pack of beer per week.      Review of Systems   Constitutional: Negative for chills and fever.   HENT: Negative for congestion, rhinorrhea and sore throat.    Eyes: Negative for pain.   Respiratory: Negative for cough and shortness of breath.    Cardiovascular: Negative for chest pain, palpitations and leg swelling.   Gastrointestinal: Negative for abdominal pain, diarrhea, nausea and vomiting.   Genitourinary: Negative for dysuria.   Musculoskeletal: Positive for myalgias. Negative for arthralgias.        Muscle cramps   Skin: Negative for rash.   Neurological: Negative for weakness and numbness.   Psychiatric/Behavioral: Negative for behavioral problems.       Past Medical History:   Diagnosis Date   • Anxiety    • Arthritis    • Atypical chest pain 3/9/2017   • CAD, multiple vessel      History of cardiac stenting to the RCA September 2006 with subsequent cardiac catheterizations in 2010 and 2012 reporting minimal disease with patent stent.   • COPD (chronic obstructive pulmonary disease)       Moderate obstruction with previous improvement on Spiriva;  Long-standing history of tobacco abuse; Bullous emphysema noted on CT scan of the chest with history of prior spontaneous pneumothorax.   • Dyslipidemia    • Emphysema of lung    • Fatigue    • GERD (gastroesophageal reflux disease)    • Hydropneumothorax      CT scan of the chest 06/17/2014, reports A. probable chronic anterior left chest hydropneumothorax.   • Hypertension    • Leg pain     Normal KIMMY, " 09/13/2006, at [facility].     • Pulmonary nodules      CT scan of the chest 06/17/2014 reports scattered 2-3 mm noncalcified nodules in the right middle lobe and left lower lobe, previous CT scan of the chest 09/16/2011 reported multiple 2 mm right middle lobe nodules.   • Renal calculi    • Skin cancer    • Spontaneous pneumothorax     Secondary to bullous emphysema, x2 in 1997.       No Known Allergies    Past Surgical History:   Procedure Laterality Date   • BILATERAL BREAST REDUCTION     • CARDIAC CATHETERIZATION      Catheterization 12/21/2012 reports noncritical coronary artery disease and a widely patent right coronary artery stent.   • CARDIAC CATHETERIZATION N/A 5/26/2017    Procedure: Left Heart Cath;  Surgeon: Anai Sweet MD;  Location: [facility] CATH INVASIVE LOCATION;  Service:    • COLONOSCOPY      5 years ago with    • CORONARY STENT PLACEMENT  09/12/2006   • ENDOSCOPY     • INGUINAL HERNIA REPAIR  2009   • PLEURAL SCARIFICATION Right     x 2  in 96 and 97   • POLYPECTOMY  2006    Status post polypectomy x2, noncancerous, spring 2006.       Family History   Problem Relation Age of Onset   • Heart disease Mother    • Diabetes Father    • Heart disease Father    • Cancer Sister      mass in lower intestine   • Heart disease Brother    • Liver disease Brother    • Cancer Brother      colon       Social History     Social History   • Marital status:      Spouse name: N/A   • Number of children: N/A   • Years of education: N/A     Social History Main Topics   • Smoking status: Former Smoker     Packs/day: 1.50   • Smokeless tobacco: Current User     Types: Snuff      Comment: quit smoking early in 2015 and picked back up smoking in the fall of 2015. Pt now says he smokes 2 cigarettes daily   • Alcohol use 14.4 oz/week     24 Cans of beer per week      Comment: weekly   • Drug use: No   • Sexual activity: Defer     Other Topics Concern   • None     Social History Narrative            Objective   Physical Exam   Constitutional: He is oriented to person, place, and time. He appears well-developed and well-nourished. No distress.   HENT:   Head: Normocephalic and atraumatic.   Right Ear: External ear normal.   Left Ear: External ear normal.   Nose: Nose normal.   Mouth/Throat: Oropharynx is clear and moist.   Eyes: Conjunctivae and EOM are normal. Pupils are equal, round, and reactive to light.   Neck: Normal range of motion. Neck supple.   Cardiovascular: Normal rate, regular rhythm, normal heart sounds and intact distal pulses.    Pulmonary/Chest: Effort normal and breath sounds normal. No respiratory distress.   Abdominal: Soft. Bowel sounds are normal. He exhibits no distension. There is no tenderness. There is no rebound and no guarding.   Musculoskeletal: Normal range of motion. He exhibits no edema, tenderness or deformity.   Neurological: He is alert and oriented to person, place, and time.   Skin: Skin is warm and dry. No rash noted.   Psychiatric: He has a normal mood and affect. His behavior is normal.   Nursing note and vitals reviewed.      Procedures         ED Course  ED Course                  MDM  Number of Diagnoses or Management Options  Alcohol abuse:   Dehydration:   Elevated lipase:   Diagnosis management comments: 57-year-old male with muscle cramps. Well developed, well nourished man in no distress with normal vitals and an otherwise non focal exam. He likely dehydrated. Will check labs, hydrate and treat symptomatically. Disposition pending work up.    Ddx: dehydration, lyte abnormality, intoxication, alcohol abuse    Patient feeling much better after treatment here.  His CK and lipase are both mildly elevated.  There is no blood in his urine.  He would like to go home.  At this time I feel that is a reasonable plan.  Encouraged oral hydration.  Will follow-up with his doctor in a few days for recheck.       Amount and/or Complexity of Data Reviewed  Clinical lab  tests: reviewed        Final diagnoses:   Alcohol abuse   Dehydration   Elevated lipase            Mario Mayers MD  09/04/17 [id number]

## 2017-09-28 ENCOUNTER — OFFICE VISIT (OUTPATIENT)
Dept: INTERNAL MEDICINE | Facility: CLINIC | Age: 57
End: 2017-09-28

## 2017-09-28 VITALS
DIASTOLIC BLOOD PRESSURE: 80 MMHG | OXYGEN SATURATION: 96 % | HEIGHT: 71 IN | BODY MASS INDEX: 26.74 KG/M2 | TEMPERATURE: 97.7 F | RESPIRATION RATE: 12 BRPM | SYSTOLIC BLOOD PRESSURE: 132 MMHG | WEIGHT: 191 LBS | HEART RATE: 63 BPM

## 2017-09-28 DIAGNOSIS — I25.10 MULTIPLE VESSEL CORONARY ARTERY DISEASE: ICD-10-CM

## 2017-09-28 DIAGNOSIS — I10 ESSENTIAL HYPERTENSION: Primary | ICD-10-CM

## 2017-09-28 DIAGNOSIS — G47.09 OTHER INSOMNIA: ICD-10-CM

## 2017-09-28 DIAGNOSIS — K21.9 GASTROESOPHAGEAL REFLUX DISEASE WITHOUT ESOPHAGITIS: ICD-10-CM

## 2017-09-28 DIAGNOSIS — J43.1 PANLOBULAR EMPHYSEMA (HCC): ICD-10-CM

## 2017-09-28 PROCEDURE — 99214 OFFICE O/P EST MOD 30 MIN: CPT | Performed by: INTERNAL MEDICINE

## 2017-09-28 RX ORDER — TEMAZEPAM 15 MG/1
15 CAPSULE ORAL NIGHTLY PRN
Qty: 30 CAPSULE | Refills: 3 | OUTPATIENT
Start: 2017-09-28 | End: 2017-10-05 | Stop reason: SDUPTHER

## 2017-09-28 NOTE — PROGRESS NOTES
"Yvan Crow is a 57 y.o. male    HPI coming in for follow-up patient with coronary artery disease with hypertension hyperlipidemia is trying to a down on his smoking. No new complaints    The following portions of the patient's history were reviewed and updated as appropriate: allergies, current medications, past family history, past medical history, past social history, past surgical history, and problem list.     Review of Systems   Constitutional: Negative.  Negative for activity change, appetite change, fatigue and fever.   HENT: Negative for congestion, ear discharge, ear pain and trouble swallowing.    Eyes: Negative for photophobia and visual disturbance.   Respiratory: Negative for cough and shortness of breath.    Cardiovascular: Negative for chest pain and palpitations.   Gastrointestinal: Negative for abdominal distention, abdominal pain, constipation, diarrhea, nausea and vomiting.   Endocrine: Negative.    Genitourinary: Negative for dysuria, hematuria and urgency.   Musculoskeletal: Positive for arthralgias. Negative for back pain, joint swelling and myalgias.   Skin: Negative for color change and rash.   Allergic/Immunologic: Negative.    Neurological: Negative for dizziness, weakness, light-headedness and headaches.   Hematological: Negative for adenopathy. Does not bruise/bleed easily.   Psychiatric/Behavioral: Positive for sleep disturbance. Negative for agitation, confusion and dysphoric mood. The patient is not nervous/anxious.        Visit Vitals   • /80   • Pulse 63   • Temp 97.7 °F (36.5 °C)   • Resp 12   • Ht 71\" (180.3 cm)   • Wt 191 lb (86.6 kg)   • SpO2 96%   • BMI 26.64 kg/m2       Objective  Physical Exam   Constitutional: He is oriented to person, place, and time. He appears well-developed and well-nourished. No distress.   HENT:   Nose: Nose normal.   Mouth/Throat: Oropharynx is clear and moist.   Eyes: Conjunctivae and EOM are normal. No scleral icterus.   Neck: " No tracheal deviation present. No thyromegaly present.   Cardiovascular: Normal rate and regular rhythm.  Exam reveals no friction rub.    No murmur heard.  Pulmonary/Chest: No respiratory distress. He has no wheezes. He has no rales.   Abdominal: Soft. He exhibits no distension and no mass. There is no tenderness. There is no guarding.   Musculoskeletal: Normal range of motion. He exhibits no deformity.   Lymphadenopathy:     He has no cervical adenopathy.   Neurological: He is alert and oriented to person, place, and time. He has normal reflexes. No cranial nerve deficit. Coordination normal.   Skin: Skin is warm and dry. No rash noted. No erythema.   Psychiatric: He has a normal mood and affect. His behavior is normal. Judgment and thought content normal.       Diagnoses and all orders for this visit:    Essential hypertension stable with current meds and low-salt diet    Gastroesophageal reflux disease without esophagitis continue with reflux precautions and proton pump inhibitors    Multiple vessel coronary artery disease counseled about smoking cessation again    Panlobular emphysema stable

## 2017-10-05 DIAGNOSIS — G47.09 OTHER INSOMNIA: ICD-10-CM

## 2017-10-09 RX ORDER — TEMAZEPAM 15 MG/1
CAPSULE ORAL
Qty: 30 CAPSULE | Refills: 0 | Status: SHIPPED | OUTPATIENT
Start: 2017-10-09 | End: 2018-10-08 | Stop reason: SDUPTHER

## 2017-10-17 RX ORDER — ISOSORBIDE MONONITRATE 30 MG/1
TABLET, EXTENDED RELEASE ORAL
Qty: 90 TABLET | Refills: 3 | Status: SHIPPED | OUTPATIENT
Start: 2017-10-17 | End: 2018-02-15 | Stop reason: SDUPTHER

## 2017-11-17 RX ORDER — SIMVASTATIN 80 MG
TABLET ORAL
Qty: 90 TABLET | Refills: 2 | Status: SHIPPED | OUTPATIENT
Start: 2017-11-17 | End: 2019-04-08 | Stop reason: SDUPTHER

## 2017-12-07 ENCOUNTER — HOSPITAL ENCOUNTER (OUTPATIENT)
Dept: OTHER | Age: 57
Discharge: HOME | End: 2017-12-07
Attending: INTERNAL MEDICINE | Admitting: INTERNAL MEDICINE

## 2017-12-07 ENCOUNTER — OFFICE VISIT (OUTPATIENT)
Dept: CARDIOLOGY | Facility: CLINIC | Age: 57
End: 2017-12-07

## 2017-12-07 VITALS
HEART RATE: 80 BPM | DIASTOLIC BLOOD PRESSURE: 76 MMHG | HEIGHT: 71 IN | BODY MASS INDEX: 26.88 KG/M2 | SYSTOLIC BLOOD PRESSURE: 152 MMHG | WEIGHT: 192 LBS

## 2017-12-07 DIAGNOSIS — I10 ESSENTIAL HYPERTENSION: ICD-10-CM

## 2017-12-07 DIAGNOSIS — E78.5 DYSLIPIDEMIA: ICD-10-CM

## 2017-12-07 DIAGNOSIS — I25.10 MULTIPLE VESSEL CORONARY ARTERY DISEASE: Primary | ICD-10-CM

## 2017-12-07 PROCEDURE — 93010 ELECTROCARDIOGRAM REPORT: CPT | Performed by: NURSE PRACTITIONER

## 2017-12-07 PROCEDURE — 99214 OFFICE O/P EST MOD 30 MIN: CPT | Performed by: NURSE PRACTITIONER

## 2017-12-07 NOTE — PROGRESS NOTES
Johnny Crow  1960  [phone]      12/07/2017    Sagar Cid MD    Chief Complaint:  Chest pain    PROBLEM LIST:  1. Coronary artery disease:  a. Non-ST-elevation MI, 09/11/2006.  b. LHC, 09/12/2006, with primary stenting of the proximal and mid  right coronary artery using a 2.5 X 18 mm drug-eluting Cypher stent; 80% circumflex obtuse marginal subsegment ostial stenosis too small for intervention; 50% mid to distal circumflex stenosis; LAD maximal stenosis of 50%.   c. Cardiolite GXT, 06/26/2008:   Abnormal Cardiolite GXT with an area of stress-induced basal and mid posterolateral hypoperfusion.  LVEF 50% with mild inferobasilar hypokinesis.    d. Left heart catheterization, 06/27/2008, Dr. Irwin Nascimento:  LVEF 55% to 60% with total occlusion of  a small superior branch of the first OM with late filling, nonobstructive coronary disease in the first diagonal.   e. Repeat Cardiolite GXT for chest pain, 01/23/2009:  Exercise duration of 12 minutes with no chest discomfort.  An area of mild stress-induced  basal and mid posterolateral hypoperfusion was seen identical to the previous findings from June of 2008 at which time cardiac catheterization showed no significant findings.  LVEF 56%.  f. Echocardiogram by Dr. Calvillo, 12/16/2009: Exercise duration  of 12 minutes and 20 seconds with a maximal systolic pressure of 202 mmHg and mid and basilar septal and inferior hypokinesis on post exercise images.  g. Left heart catheterization, 05/07/2010:  40% RCA stenosis with an occluded third obtuse marginal  branch felt responsible for the patient’s abnormal stress test.  Normal LV systolic function.  h. Recurrent angina with negative gallbladder ultrasound, December 2012.  i. Cardiac catheterization, 12/21/2012, Anai Sweet MD, revealing  a widely patent RCA stent, normal LV systolic function and wall motion, and a 40% to 50% stenosis of the second diagonal branch.  j. Cardiac catheterization,  5/26/2017: Successful but complex PTCA/stent placement in the ostial circumflex using a 2.5 x 15 mm Alpine drug-eluting stent. Residual diagonal disease, occluded obtuse marginal which is an old finding. Patent right coronary with disease distal to the right coronary which does not appear critical. Mildly reduced LV systolic function with lateral hypokinesis prior to intervention.  2. Tobacco use with cessation, 07/07/2014.  3. Dyslipidemia.   4. Hypertension.  5. Leg pain:  a.  Normal KIMMY, 09/13/2006, at [facility].    6. Status  post polypectomy x2, noncancerous, spring 2006.  7. Spontaneous pneumothorax x2.   8. Bilateral lung nodules, left greater than right, currently under treatment by the [facility].      No Known Allergies    Current Medications:      Current Outpatient Prescriptions:   •  amLODIPine (NORVASC) 5 MG tablet, Take 1 tablet by mouth Daily., Disp: 90 tablet, Rfl: 3  •  aspirin 325 MG tablet, Take 1 tablet by mouth daily., Disp: , Rfl:   •  clopidogrel (PLAVIX) 75 MG tablet, Take 1 tablet by mouth Daily., Disp: 90 tablet, Rfl: 3  •  escitalopram (LEXAPRO) 10 MG tablet, take 1 tablet by mouth once daily, Disp: 30 tablet, Rfl: 5  •  isosorbide mononitrate (IMDUR) 30 MG 24 hr tablet, take 1 tablet by mouth once daily, Disp: 90 tablet, Rfl: 3  •  metoprolol succinate XL (TOPROL-XL) 50 MG 24 hr tablet, take 1 tablet by mouth once daily, Disp: 90 tablet, Rfl: 3  •  nicotine (NICOTROL) 10 MG inhaler, Inhale 1 puff As Needed for Smoking Cessation., Disp: 168 each, Rfl: 0  •  nitroglycerin (NITROSTAT) 0.4 MG SL tablet, Place 1 tablet under the tongue Every 5 (Five) Minutes As Needed for chest pain., Disp: 25 tablet, Rfl: 5  •  omeprazole (priLOSEC) 20 MG capsule, take 1 capsule by mouth once daily, Disp: 90 capsule, Rfl: 2  •  simvastatin (ZOCOR) 80 MG tablet, take 1 tablet by mouth once daily, Disp: 90 tablet, Rfl: 2  •  temazepam (RESTORIL) 15 MG capsule, take 1 capsule by mouth every  "evening if needed for sleep, Disp: 30 capsule, Rfl: 0    HPI    Johnny Crow is a pleasant 57-year-old male who presents today for 4 month follow up of coronary artery disease, hypertension, and hyperlipidemia. Since last visit, patient has been doing well other than a single episode of chest pain that occurred last week.  He did not require any nitroglycerin but did take an additional baby aspirin at that time.  He describes it as a dull pinching sensation to the center of his chest without radiation or associated symptoms.  This does not mimic his previous anginal equivalent symptoms when he has had intervention in the past.  This chest pain occurred at rest and has had no further recurrence since then.  He denies having any shortness of breath, dyspnea on exertion, edema, fatigue, palpitations, dizziness and syncope.    The following portions of the patient's history were reviewed and updated as appropriate: allergies, current medications and problem list.    Pertinent positives as listed in the HPI.  All other systems reviewed are negative.    Vitals:    12/07/17 1317   BP: 152/76   BP Location: Right arm   Patient Position: Sitting   Cuff Size: Adult   Pulse: 80   Weight: 87.1 kg (192 lb)   Height: 180.3 cm (71\")       Physical Exam:  GENERAL: well-developed, well-nourished; in no acute distress.   NECK:  There is no jugular venous distention at 30°.  Carotid upstrokes are 2+ and  symmetrical without bruits.   LUNGS: Clear to auscultation bilaterally without wheezing, rhonchi, or rales noted.   CARDIOVASCULAR: The heart has a regular rate with a normal S1 and S2. There is no murmur, gallop, rub, or click appreciated. The PMI is nondisplaced.   ABDOMEN: Soft and nontender  NEUROLOGICAL: Nonfocal; Alert and oriented  PERIPHERAL VASCULAR:  Posterior tibial and dorsalis pedis pulses are 2+ and symmetrical. There is no peripheral edema.   MUSCULOSKELETAL:  Normal ROM  SKIN:  Warm and dry  PSYCHIATRIC: normal mood " and affect; behavior appropriate    Diagnostic Data:    Lab Results   Component Value Date    GLUCOSE 107 (H) 09/04/2017    BUN 5 (L) 09/04/2017    CREATININE 0.60 09/04/2017    EGFRIFNONA 139 09/04/2017    EGFRIFAFRI 141 05/23/2017    BCR 8.3 09/04/2017    K 3.9 09/04/2017    CO2 25.0 (L) 09/04/2017    CALCIUM 9.4 09/04/2017    PROTENTOTREF 7.2 05/23/2017    ALBUMIN 4.20 09/04/2017    LABIL2 1.2 09/04/2017    AST 83 (H) 09/04/2017    ALT 62 09/04/2017     Lab Results   Component Value Date    WBC 6.92 09/04/2017    HGB 14.3 09/04/2017    HCT 40.6 (L) 09/04/2017    MCV 93.8 09/04/2017     09/04/2017     Lab Results   Component Value Date    TRIG 145 05/23/2017    HDL 42 05/23/2017    LDLDIRECT 65 11/12/2015    AST 83 (H) 09/04/2017    ALT 62 09/04/2017       ECG 12 Lead  Date/Time: 12/8/2017 3:44 PM  Performed by: SONG GARCIA  Authorized by: SONG GARCIA   Rhythm: sinus rhythm  Ectopy: PVCs  Rate: normal  BPM: 80  Clinical impression: abnormal ECG  Comments: Nonspecific ST and T wave abnormality        Assessment:      ICD-10-CM ICD-9-CM   1. Multiple vessel coronary artery disease- stable I25.10 414.00   2. Essential hypertension- well controlled I10 401.9   3. Dyslipidemia- on statin E78.5 272.4       Plan:    1. Continue current medications.  2. F/up in 6 months or sooner if needed.    Seen independently by JOY Briones on December 7, 2017 @ [id number]

## 2018-01-04 ENCOUNTER — TELEPHONE (OUTPATIENT)
Dept: INTERNAL MEDICINE | Facility: CLINIC | Age: 58
End: 2018-01-04

## 2018-01-04 RX ORDER — ESCITALOPRAM OXALATE 10 MG/1
10 TABLET ORAL DAILY
Qty: 90 TABLET | Refills: 3 | Status: SHIPPED | OUTPATIENT
Start: 2018-01-04 | End: 2019-01-18 | Stop reason: SDUPTHER

## 2018-02-02 ENCOUNTER — TELEPHONE (OUTPATIENT)
Dept: CARDIOLOGY | Facility: CLINIC | Age: 58
End: 2018-02-02

## 2018-02-02 NOTE — TELEPHONE ENCOUNTER
PT calls to report that his BP has been running a little high lately- 150's/70-80's- HR 70's- will have him to increase Amlodipine to 10 mg daily and he will call me back in 1-2 weeks with an update- he verbalized understanding-

## 2018-02-15 RX ORDER — ISOSORBIDE MONONITRATE 30 MG/1
TABLET, EXTENDED RELEASE ORAL
Qty: 90 TABLET | Refills: 3 | Status: SHIPPED | OUTPATIENT
Start: 2018-02-15 | End: 2019-05-03 | Stop reason: SDUPTHER

## 2018-02-15 RX ORDER — METOPROLOL SUCCINATE 50 MG/1
TABLET, EXTENDED RELEASE ORAL
Qty: 90 TABLET | Refills: 3 | Status: SHIPPED | OUTPATIENT
Start: 2018-02-15 | End: 2019-04-12 | Stop reason: SDUPTHER

## 2018-02-15 RX ORDER — SIMVASTATIN 80 MG
TABLET ORAL
Qty: 90 TABLET | Refills: 3 | Status: SHIPPED | OUTPATIENT
Start: 2018-02-15 | End: 2018-07-19 | Stop reason: SDUPTHER

## 2018-04-27 ENCOUNTER — TELEPHONE (OUTPATIENT)
Dept: INTERNAL MEDICINE | Facility: CLINIC | Age: 58
End: 2018-04-27

## 2018-04-27 NOTE — TELEPHONE ENCOUNTER
LEFT SHOULDER PAIN FOR A FEW MONTHS. IN [place] DOING CHEMO AT THE [facility]. THE [facility] SAYS AN ORDER FROM THE PCP WOULD NEED TO BE FAXED FOR THEM TO DO IT.     MRI AT [facility].  [phone]    PLEASE ADVISE

## 2018-04-27 NOTE — TELEPHONE ENCOUNTER
Discussed with patient. He needs to be evaluated by an orthopedic surgeon who may or may not order further testing.

## 2018-05-03 RX ORDER — OMEPRAZOLE 20 MG/1
CAPSULE, DELAYED RELEASE ORAL
Qty: 90 CAPSULE | Refills: 0 | Status: SHIPPED | OUTPATIENT
Start: 2018-05-03 | End: 2018-08-01 | Stop reason: SDUPTHER

## 2018-06-05 RX ORDER — CLOPIDOGREL BISULFATE 75 MG/1
TABLET ORAL
Qty: 90 TABLET | Refills: 0 | Status: SHIPPED | OUTPATIENT
Start: 2018-06-05 | End: 2018-10-08

## 2018-06-05 RX ORDER — AMLODIPINE BESYLATE 5 MG/1
TABLET ORAL
Qty: 90 TABLET | Refills: 0 | Status: SHIPPED | OUTPATIENT
Start: 2018-06-05 | End: 2018-09-06 | Stop reason: SDUPTHER

## 2018-07-19 ENCOUNTER — OFFICE VISIT (OUTPATIENT)
Dept: CARDIOLOGY | Facility: CLINIC | Age: 58
End: 2018-07-19

## 2018-07-19 VITALS
WEIGHT: 189.6 LBS | HEART RATE: 73 BPM | BODY MASS INDEX: 26.54 KG/M2 | HEIGHT: 71 IN | DIASTOLIC BLOOD PRESSURE: 60 MMHG | SYSTOLIC BLOOD PRESSURE: 110 MMHG

## 2018-07-19 DIAGNOSIS — I25.10 MULTIPLE VESSEL CORONARY ARTERY DISEASE: Primary | ICD-10-CM

## 2018-07-19 DIAGNOSIS — I10 ESSENTIAL HYPERTENSION: ICD-10-CM

## 2018-07-19 DIAGNOSIS — E78.5 HYPERLIPIDEMIA LDL GOAL <70: ICD-10-CM

## 2018-07-19 DIAGNOSIS — Z72.0 TOBACCO USE: ICD-10-CM

## 2018-07-19 PROCEDURE — 99213 OFFICE O/P EST LOW 20 MIN: CPT | Performed by: INTERNAL MEDICINE

## 2018-07-19 NOTE — PROGRESS NOTES
Johnny Crow  1960  [phone]      07/19/2018    Sagar Cid MD    Chief Complaint   Patient presents with   • Coronary Artery Disease     denies having any complaints       Problem List:  1. Coronary artery disease:  a. Non-ST-elevation MI, 09/11/2006.  b. LHC, 09/12/2006, with primary stenting of the proximal and mid  right coronary artery using a 2.5 X 18 mm drug-eluting Cypher stent; 80% circumflex obtuse marginal subsegment ostial stenosis too small for intervention; 50% mid to distal circumflex stenosis; LAD maximal stenosis of 50%.   c. Cardiolite GXT, 06/26/2008:   Abnormal Cardiolite GXT with an area of stress-induced basal and mid posterolateral hypoperfusion.  LVEF 50% with mild inferobasilar hypokinesis.    d. Left heart catheterization, 06/27/2008, Dr. Irwin Nascimento:  LVEF 55% to 60% with total occlusion of  a small superior branch of the first OM with late filling, nonobstructive coronary disease in the first diagonal.   e. Repeat Cardiolite GXT for chest pain, 01/23/2009:  Exercise duration of 12 minutes with no chest discomfort.  An area of mild stress-induced  basal and mid posterolateral hypoperfusion was seen identical to the previous findings from June of 2008 at which time cardiac catheterization showed no significant findings.  LVEF 56%.  f. Echocardiogram by Dr. Calvillo, 12/16/2009: Exercise duration  of 12 minutes and 20 seconds with a maximal systolic pressure of 202 mmHg and mid and basilar septal and inferior hypokinesis on post exercise images.  g. Left heart catheterization, 05/07/2010:  40% RCA stenosis with an occluded third obtuse marginal  branch felt responsible for the patient’s abnormal stress test.  Normal LV systolic function.  h. Recurrent angina with negative gallbladder ultrasound, December 2012.  i. Cardiac catheterization, 12/21/2012, Anai Sweet MD, revealing  a widely patent RCA stent, normal LV systolic function and wall motion, and a 40% to 50%  stenosis of the second diagonal branch.  j. Cardiac catheterization, 5/26/2017: Successful but complex PTCA/stent placement in the ostial circumflex using a 2.5 x 15 mm Alpine drug-eluting stent. Residual diagonal disease, occluded obtuse marginal which is an old finding. Patent right coronary with disease distal to the right coronary which does not appear critical. Mildly reduced LV systolic function with lateral hypokinesis prior to intervention.  [place], [place] 2018: nl per pt  2. Tobacco use with cessation, 07/07/2014.  3. Dyslipidemia.   4. Hypertension.  5. Leg pain:  a.  Normal KIMMY, 09/13/2006, at [facility].    6. Status  post polypectomy x2, noncancerous, spring 2006.  7. Spontaneous pneumothorax x2.   8. Bilateral lung nodules, left greater than right, currently under treatment by the [facility].    No Known Allergies    Current Medications:      Current Outpatient Prescriptions:   •  amLODIPine (NORVASC) 5 MG tablet, TAKE ONE TABLET BY MOUTH ONCE DAILY, Disp: 90 tablet, Rfl: 0  •  aspirin 325 MG tablet, Take 1 tablet by mouth daily., Disp: , Rfl:   •  clopidogrel (PLAVIX) 75 MG tablet, TAKE ONE TABLET BY MOUTH ONCE DAILY, Disp: 90 tablet, Rfl: 0  •  escitalopram (LEXAPRO) 10 MG tablet, Take 1 tablet by mouth Daily., Disp: 90 tablet, Rfl: 3  •  isosorbide mononitrate (IMDUR) 30 MG 24 hr tablet, take 1 tablet by mouth once daily, Disp: 90 tablet, Rfl: 3  •  metoprolol succinate XL (TOPROL-XL) 50 MG 24 hr tablet, take 1 tablet by mouth once daily, Disp: 90 tablet, Rfl: 3  •  nicotine (NICOTROL) 10 MG inhaler, Inhale 1 puff As Needed for Smoking Cessation., Disp: 168 each, Rfl: 0  •  nitroglycerin (NITROSTAT) 0.4 MG SL tablet, Place 1 tablet under the tongue Every 5 (Five) Minutes As Needed for chest pain., Disp: 25 tablet, Rfl: 5  •  omeprazole (priLOSEC) 20 MG capsule, TAKE ONE CAPSULE BY MOUTH ONCE DAILY, Disp: 90 capsule, Rfl: 0  •  simvastatin (ZOCOR) 80 MG tablet, take 1 tablet  "by mouth once daily, Disp: 90 tablet, Rfl: 2  •  temazepam (RESTORIL) 15 MG capsule, take 1 capsule by mouth every evening if needed for sleep, Disp: 30 capsule, Rfl: 0    HPI    Johnny Crow presents today for 6 month follow up of coronary artery disease, hypertension, and hyperlipidemia. Since last visit, patient has been feeling well overall from a cardiovascular standpoint. He denies chest pain or discomfort. He notes that when he was in [place] for the winter, he experienced a flush feeling and felt very fatigued. He subsequently underwent a heart cath ay [facility] in [place], [place] that was reportedly normal. Patient denies chest pain, palpitations, shortness of breath, edema, PND, orthopnea, dizziness, and syncope. He is still currently taking his Plavix. He admittedly has not been doing anything in terms of exercise as of late. He has been dealing with the stress of his wife battling cancer. He states that he has also began smoking again.    The following portions of the patient's history were reviewed and updated as appropriate: allergies, current medications and problem list.    Pertinent positives as listed in the HPI.  All other systems reviewed are negative.    Vitals:    07/19/18 1303   BP: 110/60   BP Location: Right arm   Patient Position: Sitting   Pulse: 73   Weight: 86 kg (189 lb 9.6 oz)   Height: 180.3 cm (71\")       Physical Exam:  General: Alert and oriented to person, place, and time.  Neck: Jugular venous pressure is within normal limits. Carotids have normal upstrokes without bruits.   Cardiovascular: Regular rate and rhythm without murmur gallop or rub.  Lungs: Clear without rales or wheezes. Equal expansion is noted.   Extremities: Show no edema.  Skin: warm and dry.  Neurologic: nonfocal    Diagnostic Data:    Lab Results   Component Value Date    GLUCOSE 107 (H) 09/04/2017    BUN 5 (L) 09/04/2017    CREATININE 0.60 09/04/2017    EGFRIFNONA 139 09/04/2017    EGFRIFAFRI 141 " 05/23/2017    BCR 8.3 09/04/2017    K 3.9 09/04/2017    CO2 25.0 (L) 09/04/2017    CALCIUM 9.4 09/04/2017    PROTENTOTREF 7.2 05/23/2017    ALBUMIN 4.20 09/04/2017    LABIL2 1.4 (L) 05/23/2017    AST 83 (H) 09/04/2017    ALT 62 09/04/2017     Lab Results   Component Value Date    WBC 6.92 09/04/2017    HGB 14.3 09/04/2017    HCT 40.6 (L) 09/04/2017    MCV 93.8 09/04/2017     09/04/2017     Procedures    Assessment:      ICD-10-CM ICD-9-CM   1. Multiple vessel coronary artery disease I25.10 414.00   2. Essential hypertension I10 401.9   3. Hyperlipidemia LDL goal <70 E78.5 272.4   4. Tobacco use Z72.0 305.1       Plan:    1. ObtainFLP/LFT  2. Start routine exercise reigmen; 30 minutes per day, 4-5 days per week.  3. Smoking cessation highly recommended. Chantix prescribed  4. Discontinue Plavix at this time.  5. Continue current medications.  6. F/up in 12 months or sooner if needed.    Scribed for Anai Sweet MD by Jaime Cardenas. 7/19/2018  1:18 PM     I Anai Sweet MD personally performed the services described in this documentation as scribed by the above individual in my presence, and it is both accurate and complete.    Anai Sweet MD, [facility]

## 2018-07-26 LAB
ALBUMIN SERPL-MCNC: 4.3 G/DL (ref 3.5–5)
ALP SERPL-CCNC: 63 U/L (ref 38–126)
ALT SERPL-CCNC: 51 U/L (ref 13–69)
AST SERPL-CCNC: 51 U/L (ref 15–46)
BILIRUB DIRECT SERPL-MCNC: 0.2 MG/DL (ref 0–0.4)
BILIRUB SERPL-MCNC: 1.1 MG/DL (ref 0.2–1.3)
CHOLEST SERPL-MCNC: 126 MG/DL (ref 0–199)
HDLC SERPL-MCNC: 47 MG/DL (ref 40–60)
LDLC SERPL CALC-MCNC: 55 MG/DL (ref 0–99)
PROT SERPL-MCNC: 7.5 G/DL (ref 6.3–8.2)
TRIGL SERPL-MCNC: 120 MG/DL
VLDLC SERPL CALC-MCNC: 24 MG/DL

## 2018-08-01 RX ORDER — OMEPRAZOLE 20 MG/1
20 CAPSULE, DELAYED RELEASE ORAL DAILY
Qty: 90 CAPSULE | Refills: 3 | Status: SHIPPED | OUTPATIENT
Start: 2018-08-01 | End: 2019-08-05 | Stop reason: SDUPTHER

## 2018-09-06 RX ORDER — AMLODIPINE BESYLATE 5 MG/1
TABLET ORAL
Qty: 90 TABLET | Refills: 1 | Status: SHIPPED | OUTPATIENT
Start: 2018-09-06 | End: 2018-12-10 | Stop reason: SDUPTHER

## 2018-10-08 ENCOUNTER — OFFICE VISIT (OUTPATIENT)
Dept: INTERNAL MEDICINE | Facility: CLINIC | Age: 58
End: 2018-10-08

## 2018-10-08 VITALS
HEIGHT: 71 IN | DIASTOLIC BLOOD PRESSURE: 70 MMHG | WEIGHT: 187 LBS | HEART RATE: 65 BPM | OXYGEN SATURATION: 98 % | TEMPERATURE: 97.8 F | SYSTOLIC BLOOD PRESSURE: 110 MMHG | BODY MASS INDEX: 26.18 KG/M2

## 2018-10-08 DIAGNOSIS — J43.1 PANLOBULAR EMPHYSEMA (HCC): ICD-10-CM

## 2018-10-08 DIAGNOSIS — F41.9 ANXIETY: ICD-10-CM

## 2018-10-08 DIAGNOSIS — K21.9 GASTROESOPHAGEAL REFLUX DISEASE WITHOUT ESOPHAGITIS: ICD-10-CM

## 2018-10-08 DIAGNOSIS — M54.50 ACUTE MIDLINE LOW BACK PAIN WITHOUT SCIATICA: Primary | ICD-10-CM

## 2018-10-08 DIAGNOSIS — G47.09 OTHER INSOMNIA: ICD-10-CM

## 2018-10-08 DIAGNOSIS — I25.10 MULTIPLE VESSEL CORONARY ARTERY DISEASE: ICD-10-CM

## 2018-10-08 DIAGNOSIS — I10 ESSENTIAL HYPERTENSION: ICD-10-CM

## 2018-10-08 LAB
BILIRUB BLD-MCNC: ABNORMAL MG/DL
CLARITY, POC: CLEAR
COLOR UR: YELLOW
GLUCOSE UR STRIP-MCNC: NEGATIVE MG/DL
KETONES UR QL: NEGATIVE
LEUKOCYTE EST, POC: NEGATIVE
NITRITE UR-MCNC: NEGATIVE MG/ML
PH UR: 6.5 [PH] (ref 5–8)
PROT UR STRIP-MCNC: NEGATIVE MG/DL
RBC # UR STRIP: NEGATIVE /UL
SP GR UR: 1.01 (ref 1–1.03)
UROBILINOGEN UR QL: ABNORMAL

## 2018-10-08 PROCEDURE — G0439 PPPS, SUBSEQ VISIT: HCPCS | Performed by: INTERNAL MEDICINE

## 2018-10-08 PROCEDURE — 99396 PREV VISIT EST AGE 40-64: CPT | Performed by: INTERNAL MEDICINE

## 2018-10-08 PROCEDURE — 96160 PT-FOCUSED HLTH RISK ASSMT: CPT | Performed by: INTERNAL MEDICINE

## 2018-10-08 PROCEDURE — 81003 URINALYSIS AUTO W/O SCOPE: CPT | Performed by: INTERNAL MEDICINE

## 2018-10-08 RX ORDER — TEMAZEPAM 15 MG/1
15 CAPSULE ORAL NIGHTLY PRN
Qty: 30 CAPSULE | Refills: 0 | Status: SHIPPED | OUTPATIENT
Start: 2018-10-08 | End: 2019-04-08 | Stop reason: SDUPTHER

## 2018-10-08 RX ORDER — TEMAZEPAM 15 MG/1
15 CAPSULE ORAL NIGHTLY PRN
Qty: 30 CAPSULE | Refills: 0 | Status: CANCELLED | OUTPATIENT
Start: 2018-10-08

## 2018-10-08 NOTE — PROGRESS NOTES
QUICK REFERENCE INFORMATION:  The ABCs of the Annual Wellness Visit    Subsequent Medicare Wellness Visit    HEALTH RISK ASSESSMENT  58-year-old male with coronary artery disease, hypertension and depression dyslipidemia continues to smoke. Has been drinking excessively intermittently for the past few months. He is coming in for a wellness visit  1960    Recent Hospitalizations:  Recently treated at the following:  [facility].        Current Medical Providers:  Patient Care Team:  Sagar Cid MD as PCP - General        Smoking Status:  History   Smoking Status   • Former Smoker   • Packs/day: 1.50   Smokeless Tobacco   • Former User   • Types: Snuff   • Quit date: 2017     Comment: quit smoking early in 2015 and picked back up smoking in the fall of 2015. Pt now says he smokes 2 cigarettes daily       Alcohol Consumption:  History   Alcohol Use   • 14.4 oz/week   • 24 Cans of beer per week     Comment: weekly       Depression Screen:   PHQ-2/PHQ-9 Depression Screening 10/8/2018   Little interest or pleasure in doing things 0   Feeling down, depressed, or hopeless 0   Total Score 0       Health Habits and Functional and Cognitive Screening:  Functional & Cognitive Status 10/8/2018   Do you have difficulty preparing food and eating? No   Do you have difficulty bathing yourself, getting dressed or grooming yourself? No   Do you have difficulty using the toilet? No   Do you have difficulty moving around from place to place? No   Do you have trouble with steps or getting out of a bed or a chair? No   In the past year have you fallen or experienced a near fall? No   Current Diet Well Balanced Diet   Dental Exam Not up to date   Eye Exam Not up to date   Exercise (times per week) 0 times per week   Current Exercise Activities Include No Regular Exercise   Do you need help using the phone?  No   Are you deaf or do you have serious difficulty hearing?  No   Do you need help with transportation? No    Do you need help shopping? No   Do you need help preparing meals?  No   Do you need help with housework?  No   Do you need help with laundry? No   Do you need help taking your medications? No   Do you need help managing money? No   Do you ever drive or ride in a car without wearing a seat belt? No   Have you felt unusual stress, anger or loneliness in the last month? No   Who do you live with? Spouse   If you need help, do you have trouble finding someone available to you? No   Have you been bothered in the last four weeks by sexual problems? No   Do you have difficulty concentrating, remembering or making decisions? No       Health Habits  Current Diet: Well Balanced Diet  Dental Exam: Not up to date (DENTURES )  Eye Exam: Not up to date  Exercise (times per week): 0 times per week  Current Exercise Activities Include: No Regular Exercise      Does the patient have evidence of cognitive impairment? No    Aspirin use counseling: Taking ASA appropriately as indicated      Recent Lab Results:  CMP:  Lab Results   Component Value Date    GLU 83 05/23/2017    BUN 5 (L) 09/04/2017    CREATININE 0.60 09/04/2017    EGFRIFNONA 139 09/04/2017    EGFRIFAFRI 141 05/23/2017    BCR 8.3 09/04/2017     09/04/2017    K 3.9 09/04/2017    CO2 25.0 (L) 09/04/2017    CALCIUM 9.4 09/04/2017    PROTENTOTREF 7.5 07/26/2018    ALBUMIN 4.30 07/26/2018    LABGLOBREF 3.0 05/23/2017    LABIL2 1.4 (L) 05/23/2017    BILITOT 1.1 07/26/2018    ALKPHOS 63 07/26/2018    AST 51 (H) 07/26/2018    ALT 51 07/26/2018     Lipid Panel:  Lab Results   Component Value Date    CHLPL 126 07/26/2018    TRIG 120 07/26/2018    HDL 47 07/26/2018    VLDL 24 07/26/2018    LDL 55 07/26/2018     HbA1c:  Lab Results   Component Value Date    HGBA1C 5.7 05/06/2016       Visual Acuity:  No exam data present    Age-appropriate Screening Schedule:  Refer to the list below for future screening recommendations based on patient's age, sex and/or medical conditions.  Orders for these recommended tests are listed in the plan section. The patient has been provided with a written plan.    Health Maintenance   Topic Date Due   • TDAP/TD VACCINES (1 - Tdap) 06/14/1979   • ZOSTER VACCINE (1 of 2) 06/14/2010   • INFLUENZA VACCINE  08/01/2018   • COLONOSCOPY  06/21/2019   • LIPID PANEL  07/26/2019        Subjective   History of Present Illness    Johnny Crow is a 58 y.o. male who presents for an Subsequent Wellness Visit.    The following portions of the patient's history were reviewed and updated as appropriate: allergies, current medications, past family history, past medical history, past social history, past surgical history and problem list.    Outpatient Medications Prior to Visit   Medication Sig Dispense Refill   • amLODIPine (NORVASC) 5 MG tablet take 1 tablet by mouth once daily 90 tablet 1   • aspirin 325 MG tablet Take 1 tablet by mouth daily.     • escitalopram (LEXAPRO) 10 MG tablet Take 1 tablet by mouth Daily. 90 tablet 3   • isosorbide mononitrate (IMDUR) 30 MG 24 hr tablet take 1 tablet by mouth once daily 90 tablet 3   • metoprolol succinate XL (TOPROL-XL) 50 MG 24 hr tablet take 1 tablet by mouth once daily 90 tablet 3   • nitroglycerin (NITROSTAT) 0.4 MG SL tablet Place 1 tablet under the tongue Every 5 (Five) Minutes As Needed for chest pain. 25 tablet 5   • omeprazole (priLOSEC) 20 MG capsule Take 1 capsule by mouth Daily. 90 capsule 3   • simvastatin (ZOCOR) 80 MG tablet take 1 tablet by mouth once daily 90 tablet 2   • temazepam (RESTORIL) 15 MG capsule take 1 capsule by mouth every evening if needed for sleep 30 capsule 0   • clopidogrel (PLAVIX) 75 MG tablet TAKE ONE TABLET BY MOUTH ONCE DAILY 90 tablet 0   • nicotine (NICOTROL) 10 MG inhaler Inhale 1 puff As Needed for Smoking Cessation. 168 each 0     No facility-administered medications prior to visit.        Patient Active Problem List   Diagnosis   • Anxiety   • Multiple vessel coronary artery disease    • Hyperlipidemia LDL goal <70   • Gastroesophageal reflux disease without esophagitis   • Essential hypertension   • Emphysema/COPD (CMS/HCC)   • Inflammatory liver disease   • Multiple pulmonary nodules   • Malignant neoplasm of skin   • Tobacco use       Advance Care Planning:  has NO advance directive - information provided to the patient today    Identification of Risk Factors:  Risk factors include: cardiovascular risk, tobacco use, depression and polypharmacy.    Review of Systems   Constitutional: Positive for fatigue. Negative for activity change, appetite change and fever.   HENT: Negative for congestion, ear discharge, ear pain and trouble swallowing.    Eyes: Negative for photophobia and visual disturbance.   Respiratory: Positive for cough and shortness of breath.    Cardiovascular: Negative for chest pain and palpitations.   Gastrointestinal: Negative for abdominal distention, abdominal pain, constipation, diarrhea, nausea and vomiting.   Endocrine: Negative.    Genitourinary: Positive for dysuria. Negative for hematuria and urgency.   Musculoskeletal: Positive for arthralgias. Negative for back pain, joint swelling and myalgias.   Skin: Negative for color change and rash.   Allergic/Immunologic: Negative.    Neurological: Negative for dizziness, weakness, light-headedness and headaches.   Hematological: Negative for adenopathy. Does not bruise/bleed easily.   Psychiatric/Behavioral: Positive for sleep disturbance. Negative for agitation, confusion and dysphoric mood. The patient is nervous/anxious.         Panic attacks       Compared to one year ago, the patient feels his physical health is worse.  Compared to one year ago, the patient feels his mental health is worse.    Objective     Physical Exam   Constitutional: He is oriented to person, place, and time. He appears well-developed and well-nourished. No distress.   HENT:   Nose: Nose normal.   Mouth/Throat: Oropharynx is clear and moist.   Eyes:  "Conjunctivae and EOM are normal. No scleral icterus.   Neck: No tracheal deviation present. No thyromegaly present.   Cardiovascular: Normal rate and regular rhythm.  Exam reveals no friction rub.    No murmur heard.  Pulmonary/Chest: No respiratory distress. He has no wheezes. He has no rales.   Abdominal: Soft. He exhibits no distension and no mass. There is no tenderness. There is no guarding.   Genitourinary: Rectum normal.   Genitourinary Comments: Prostate enlarged, smooth   Musculoskeletal: Normal range of motion. He exhibits no deformity.   Lymphadenopathy:     He has no cervical adenopathy.   Neurological: He is alert and oriented to person, place, and time. He has normal reflexes. No cranial nerve deficit. Coordination normal.   Skin: Skin is warm and dry. No rash noted. No erythema.   Psychiatric: He has a normal mood and affect. His behavior is normal. Judgment and thought content normal.       Vitals:    10/08/18 1521   BP: 110/70   Pulse: 65   Temp: 97.8 °F (36.6 °C)   SpO2: 98%   Weight: 84.8 kg (187 lb)   Height: 180.3 cm (71\")   PainSc:   2   PainLoc: Back       Body mass index is 26.08 kg/m².  Discussed the patient's BMI with him. The BMI is in the acceptable range.    Assessment/Plan   Patient Self-Management and Personalized Health Advice  The patient has been provided with information about: diet, exercise, weight management, prevention of cardiac or vascular disease, tobacco cessation and designing advance directives and preventive services including:   · Advance directive, Smoking cessation counseling completed.    Visit Diagnoses:    ICD-10-CM ICD-9-CM   1. Anxiety. With h/o panic disorder. Trial of lexapro F41.9 300.00   2. Multiple vessel coronary artery disease. Recent stent placement. Lipid profile ok. Cont antiplatelet rx counseled about smoking cessation  I25.10 414.00   3. Essential hypertension. stable I10 401.9   4. Gastroesophageal reflux disease, esophagitis presence not specified. " stable K21.9 530.81   Depression counseled patient advised to cut down on alcohol use. Discussed sleep hygiene continue with Lexapro    Orders Placed This Encounter   Procedures   • POC Urinalysis Dipstick, Automated       Outpatient Encounter Prescriptions as of 10/8/2018   Medication Sig Dispense Refill   • amLODIPine (NORVASC) 5 MG tablet take 1 tablet by mouth once daily 90 tablet 1   • aspirin 325 MG tablet Take 1 tablet by mouth daily.     • CHANTIX STARTING MONTH [place]ST[place] 0.5 MG X 11 & 1 MG X 42 tablet   0   • escitalopram (LEXAPRO) 10 MG tablet Take 1 tablet by mouth Daily. 90 tablet 3   • isosorbide mononitrate (IMDUR) 30 MG 24 hr tablet take 1 tablet by mouth once daily 90 tablet 3   • metoprolol succinate XL (TOPROL-XL) 50 MG 24 hr tablet take 1 tablet by mouth once daily 90 tablet 3   • nitroglycerin (NITROSTAT) 0.4 MG SL tablet Place 1 tablet under the tongue Every 5 (Five) Minutes As Needed for chest pain. 25 tablet 5   • omeprazole (priLOSEC) 20 MG capsule Take 1 capsule by mouth Daily. 90 capsule 3   • simvastatin (ZOCOR) 80 MG tablet take 1 tablet by mouth once daily 90 tablet 2   • temazepam (RESTORIL) 15 MG capsule take 1 capsule by mouth every evening if needed for sleep 30 capsule 0   • [DISCONTINUED] clopidogrel (PLAVIX) 75 MG tablet TAKE ONE TABLET BY MOUTH ONCE DAILY 90 tablet 0   • [DISCONTINUED] nicotine (NICOTROL) 10 MG inhaler Inhale 1 puff As Needed for Smoking Cessation. 168 each 0     No facility-administered encounter medications on file as of 10/8/2018.        Reviewed use of high risk medication in the elderly: yes  Reviewed for potential of harmful drug interactions in the elderly: yes    Follow Up:  No Follow-up on file.     An After Visit Summary and PPPS with all of these plans were given to the patient.

## 2018-10-08 NOTE — PATIENT INSTRUCTIONS
Medicare Wellness  Personal Prevention Plan of Service     Date of Office Visit:  10/08/2018  Encounter Provider:  Sagar Cid MD  Place of Service:  [facility] PR[facility]  Patient Name: Johnny Crow  :  1960    As part of the Medicare Wellness portion of your visit today, we are providing you with this personalized preventive plan of services (PPPS). This plan is based upon recommendations of the [place] Preventive Services Task Force (USPSTF) and the Advisory Committee on Immunization Practices (ACIP).    This lists the preventive care services that should be considered, and provides dates of when you are due. Items listed as completed are up-to-date and do not require any further intervention.    Health Maintenance   Topic Date Due   • TDAP/TD VACCINES (1 - Tdap) 1979   • ZOSTER VACCINE (1 of 2) 2010   • HEPATITIS C SCREENING  2016   • MEDICARE ANNUAL WELLNESS  2018   • INFLUENZA VACCINE  2018   • COLONOSCOPY  2019   • LIPID PANEL  2019       Orders Placed This Encounter   Procedures   • XR Chest 2 View     Standing Status:   Future     Standing Expiration Date:   10/8/2019     Order Specific Question:   Reason for Exam:     Answer:   cough   • POC Urinalysis Dipstick, Automated       No Follow-up on file.

## 2018-10-10 ENCOUNTER — HOSPITAL ENCOUNTER (OUTPATIENT)
Dept: GENERAL RADIOLOGY | Facility: HOSPITAL | Age: 58
Discharge: HOME | End: 2018-10-10
Attending: INTERNAL MEDICINE | Admitting: INTERNAL MEDICINE

## 2018-10-10 DIAGNOSIS — I10 ESSENTIAL HYPERTENSION: ICD-10-CM

## 2018-10-10 PROCEDURE — 71046 X-RAY EXAM CHEST 2 VIEWS: CPT

## 2018-10-12 RX ORDER — NITROGLYCERIN 0.4 MG/1
TABLET SUBLINGUAL
Qty: 25 TABLET | Refills: 4 | Status: SHIPPED | OUTPATIENT
Start: 2018-10-12 | End: 2019-12-12 | Stop reason: SDUPTHER

## 2018-11-19 ENCOUNTER — OFFICE VISIT (OUTPATIENT)
Dept: INTERNAL MEDICINE | Facility: CLINIC | Age: 58
End: 2018-11-19

## 2018-11-19 VITALS
BODY MASS INDEX: 26.46 KG/M2 | WEIGHT: 189 LBS | SYSTOLIC BLOOD PRESSURE: 119 MMHG | HEART RATE: 77 BPM | TEMPERATURE: 97.3 F | HEIGHT: 71 IN | OXYGEN SATURATION: 97 % | DIASTOLIC BLOOD PRESSURE: 76 MMHG

## 2018-11-19 DIAGNOSIS — Z87.2 HISTORY OF SUN-DAMAGED SKIN: ICD-10-CM

## 2018-11-19 DIAGNOSIS — Z72.0 TOBACCO ABUSE: ICD-10-CM

## 2018-11-19 DIAGNOSIS — L98.9 SKIN LESION: ICD-10-CM

## 2018-11-19 DIAGNOSIS — K43.9 ABDOMINAL WALL HERNIA: Primary | ICD-10-CM

## 2018-11-19 DIAGNOSIS — J43.1 PANLOBULAR EMPHYSEMA (HCC): ICD-10-CM

## 2018-11-19 PROCEDURE — 99214 OFFICE O/P EST MOD 30 MIN: CPT | Performed by: PHYSICIAN ASSISTANT

## 2018-11-19 RX ORDER — ALBUTEROL SULFATE 90 UG/1
2 AEROSOL, METERED RESPIRATORY (INHALATION) EVERY 4 HOURS PRN
Qty: 1 INHALER | Refills: 11 | Status: SHIPPED | OUTPATIENT
Start: 2018-11-19 | End: 2020-04-10 | Stop reason: SDUPTHER

## 2018-11-19 NOTE — PROGRESS NOTES
Subjective     Chief Complaint: shortness of breath    History of Present Illness     Johnny Crow is a 58 y.o. male presenting with complaints of increasing shortness of breath on exertion, cough, worsening over the past 2 weeks. Denies fevers. Patient had started smoking again, but has not had anything since Saturday. He is currently wearing a nicotine patch. Has needed inhalers in the past. Used a friend's albuterol inhaler last week and he felt it helped his breathing. Denies any chest pain, palpitations, leg swelling. Normotensive, cholesterol within range. Patient of Dr. Sweet. No history of CHF.    He also complains of bulge to center of abdomen with coughing, sneezing, straining. Has had an inguinal hernia in the past and concerned this may be the same. This has been present for several months.     He also mentions a skin lesion to posterior neck. Thought it was just a blackhead or cyst but has not healed. History of skin cancer. History of sun damage as patient spends a lot of time in [place].    The following portions of the patient's history were reviewed and updated as appropriate: current medications, allergies, PMH.    Review of Systems   Constitutional: Negative for appetite change, chills, fatigue, fever and unexpected weight change.   HENT: Negative for congestion, ear pain, hearing loss, nosebleeds, sinus pressure, sore throat, tinnitus and trouble swallowing.    Eyes: Negative for photophobia, discharge and visual disturbance.   Respiratory: Positive for cough, chest tightness, shortness of breath and wheezing.    Cardiovascular: Negative for chest pain, palpitations and leg swelling.   Gastrointestinal: Negative for abdominal distention, abdominal pain, blood in stool, constipation, diarrhea, nausea and vomiting.        Abdominal bulging.   Endocrine: Negative for cold intolerance, heat intolerance, polydipsia, polyphagia and polyuria.   Genitourinary: Negative for difficulty urinating,  "dysuria, flank pain, frequency, hematuria and urgency.   Musculoskeletal: Negative for arthralgias, back pain, joint swelling, myalgias, neck pain and neck stiffness.   Skin: Negative for color change, pallor, rash and wound.        Lesion.   Allergic/Immunologic: Negative for environmental allergies, food allergies and immunocompromised state.   Neurological: Negative for dizziness, weakness, numbness and headaches.   Hematological: Negative for adenopathy. Does not bruise/bleed easily.   Psychiatric/Behavioral: Negative for dysphoric mood, hallucinations, sleep disturbance and suicidal ideas. The patient is not nervous/anxious.        Objective     Vitals:    11/19/18 1322   BP: 119/76   Pulse: 77   Temp: 97.3 °F (36.3 °C)   SpO2: 97%   Weight: 85.7 kg (189 lb)   Height: 180.3 cm (70.98\")     Physical Exam   Constitutional: He is oriented to person, place, and time. He appears well-developed and well-nourished.   HENT:   Mouth/Throat: Oropharynx is clear and moist.   Eyes: Conjunctivae and EOM are normal. Pupils are equal, round, and reactive to light.   Neck: Normal range of motion. Neck supple.   Cardiovascular: Normal rate and regular rhythm.   Pulmonary/Chest: Effort normal. He has wheezes (lower fields bilaterally).   Abdominal: A hernia is present.       Musculoskeletal: Normal range of motion.   Neurological: He is oriented to person, place, and time.   Skin: Skin is warm and dry.   Erythematous lesion with rolled borders to posterior neck, concern for skin cancer.   Psychiatric: He has a normal mood and affect.       Assessment/Plan     Diagnoses and all orders for this visit:    Abdominal wall hernia  -     Ambulatory Referral to General Surgery    Tobacco abuse  Discussed smoking cessation with patient. He is currently using nicotine patches. Has also tried Chantix in the past.    Skin lesion  -     Ambulatory Referral to Dermatology    History of sun-damaged skin  -     Ambulatory Referral to " Dermatology    Panlobular emphysema (CMS/HCC)  -     albuterol (PROVENTIL HFA;VENTOLIN HFA) 108 (90 Base) MCG/ACT inhaler; Inhale 2 puffs Every 4 (Four) Hours As Needed for Wheezing.    Giana Hennessy PA-C  11/19/2018         Please note that portions of this note were completed with a voice recognition program. Efforts were made to edit dictation, but occasionally words are mistranscribed.

## 2018-11-19 NOTE — PATIENT INSTRUCTIONS
Spiriva- 2 puffs daily (samples given)  Albuterol inhaler is rescue inhaler, sent to pharmacy  Let me know if these do not help breathing & coughing  Apply heat, may hold pillow to ribs with coughing    Referral put in for Dr. Gallardo regarding hernia- they will call you    They will call you to schedule derm appointment

## 2018-11-21 ENCOUNTER — OFFICE VISIT (OUTPATIENT)
Dept: SURGERY | Facility: CLINIC | Age: 58
End: 2018-11-21

## 2018-11-21 VITALS
DIASTOLIC BLOOD PRESSURE: 76 MMHG | HEIGHT: 71 IN | OXYGEN SATURATION: 98 % | SYSTOLIC BLOOD PRESSURE: 117 MMHG | WEIGHT: 188.8 LBS | HEART RATE: 70 BPM | BODY MASS INDEX: 26.43 KG/M2 | TEMPERATURE: 98.2 F

## 2018-11-21 DIAGNOSIS — M62.08 DIASTASIS RECTI: ICD-10-CM

## 2018-11-21 DIAGNOSIS — R19.00 ABDOMINAL WALL BULGE: Primary | ICD-10-CM

## 2018-11-21 PROCEDURE — 99203 OFFICE O/P NEW LOW 30 MIN: CPT | Performed by: SURGERY

## 2018-11-21 NOTE — PROGRESS NOTES
Patient: Johnny Crow    YOB: 1960    Date: 11/21/2018    Primary Care Provider: Sagar Cid MD    Abdominal wall bulge    Subjective .     History of present illness:  Patient is here for evaluation of epigastric hernia. Patient states he noticed bulge for months ago. Patient has a history of hernia repair. Patient denies and pain or discomfort at this time.  This does cause him some aspects of lower quadrant discomfort crampy in nature, this seems to be worse with heavy lifting, not relieved, nonradiating in nature, not associated with any other symptomatology.    The following portions of the patient's history were reviewed and updated as appropriate: allergies, current medications, past family history, past medical history, past social history, past surgical history and problem list.       Review of Systems   Constitutional: Negative for chills, fever and unexpected weight change.   HENT: Negative for trouble swallowing and voice change.    Eyes: Negative for visual disturbance.   Respiratory: Negative for apnea, cough, chest tightness, shortness of breath and wheezing.    Cardiovascular: Negative for chest pain, palpitations and leg swelling.   Gastrointestinal: Negative for abdominal distention, abdominal pain, anal bleeding, blood in stool, constipation, diarrhea, nausea, rectal pain and vomiting.   Endocrine: Negative for cold intolerance and heat intolerance.   Genitourinary: Negative for difficulty urinating, dysuria, flank pain, scrotal swelling and testicular pain.   Musculoskeletal: Negative for back pain, gait problem and joint swelling.   Skin: Negative for color change, rash and wound.   Neurological: Negative for dizziness, syncope, speech difficulty, weakness, numbness and headaches.   Hematological: Negative for adenopathy. Does not bruise/bleed easily.   Psychiatric/Behavioral: Negative for confusion. The patient is not nervous/anxious.        Allergies:  No Known  "Allergies    Medications:    Current Outpatient Medications:   •  albuterol (PROVENTIL HFA;VENTOLIN HFA) 108 (90 Base) MCG/ACT inhaler, Inhale 2 puffs Every 4 (Four) Hours As Needed for Wheezing., Disp: 1 inhaler, Rfl: 11  •  amLODIPine (NORVASC) 5 MG tablet, take 1 tablet by mouth once daily, Disp: 90 tablet, Rfl: 1  •  aspirin 325 MG tablet, Take 1 tablet by mouth daily., Disp: , Rfl:   •  CHANTIX STARTING MONTH HOUSTON 0.5 MG X 11 & 1 MG X 42 tablet, , Disp: , Rfl: 0  •  escitalopram (LEXAPRO) 10 MG tablet, Take 1 tablet by mouth Daily., Disp: 90 tablet, Rfl: 3  •  isosorbide mononitrate (IMDUR) 30 MG 24 hr tablet, take 1 tablet by mouth once daily, Disp: 90 tablet, Rfl: 3  •  metoprolol succinate XL (TOPROL-XL) 50 MG 24 hr tablet, take 1 tablet by mouth once daily, Disp: 90 tablet, Rfl: 3  •  nitroglycerin (NITROSTAT) 0.4 MG SL tablet, take 1 tablet by mouth EVERY 5 MINUTES if needed for chest pain, Disp: 25 tablet, Rfl: 4  •  omeprazole (priLOSEC) 20 MG capsule, Take 1 capsule by mouth Daily., Disp: 90 capsule, Rfl: 3  •  simvastatin (ZOCOR) 80 MG tablet, take 1 tablet by mouth once daily, Disp: 90 tablet, Rfl: 2  •  temazepam (RESTORIL) 15 MG capsule, Take 1 capsule by mouth At Night As Needed for Sleep., Disp: 30 capsule, Rfl: 0    History\"  Past Medical History:   Diagnosis Date   • Anxiety    • Arthritis    • Atypical chest pain 3/9/2017   • CAD, multiple vessel      History of cardiac stenting to the RCA September 2006 with subsequent cardiac catheterizations in 2010 and 2012 reporting minimal disease with patent stent.   • COPD (chronic obstructive pulmonary disease) (CMS/HCC)       Moderate obstruction with previous improvement on Spiriva;  Long-standing history of tobacco abuse; Bullous emphysema noted on CT scan of the chest with history of prior spontaneous pneumothorax.   • Dyslipidemia    • Emphysema of lung (CMS/HCC)    • Fatigue    • GERD (gastroesophageal reflux disease)    • Hydropneumothorax      " CT scan of the chest 06/17/2014, reports A. probable chronic anterior left chest hydropneumothorax.   • Hypertension    • Leg pain     Normal KIMMY, 09/13/2006, at [facility].     • Myocardial infarction (CMS/HCC)    • Pulmonary nodules      CT scan of the chest 06/17/2014 reports scattered 2-3 mm noncalcified nodules in the right middle lobe and left lower lobe, previous CT scan of the chest 09/16/2011 reported multiple 2 mm right middle lobe nodules.   • Renal calculi    • Skin cancer    • Spontaneous pneumothorax     Secondary to bullous emphysema, x2 in 1997.       Past Surgical History:   Procedure Laterality Date   • BILATERAL BREAST REDUCTION     • CARDIAC CATHETERIZATION      Catheterization 12/21/2012 reports noncritical coronary artery disease and a widely patent right coronary artery stent.   • CARDIAC CATHETERIZATION  02/2018    Male clinic in [facility]   • COLONOSCOPY      5 years ago with    • CORONARY STENT PLACEMENT  09/12/2006   • ENDOSCOPY     • INGUINAL HERNIA REPAIR  2009   • PLEURAL SCARIFICATION Right     x 2  in 96 and 97   • POLYPECTOMY  2006    Status post polypectomy x2, noncancerous, spring 2006.       Family History   Problem Relation Age of Onset   • Heart disease Mother    • Diabetes Father    • Heart disease Father    • Cancer Sister         mass in lower intestine   • Heart disease Brother    • Liver disease Brother    • Cancer Brother         colon       Social History     Tobacco Use   • Smoking status: Former Smoker     Packs/day: 1.50   • Smokeless tobacco: Former User     Types: Snuff     Quit date: 2017   • Tobacco comment: quit smoking early in 2015 and picked back up smoking in the fall of 2015. Pt now says he smokes 2 cigarettes daily   Substance Use Topics   • Alcohol use: Yes     Alcohol/week: 14.4 oz     Types: 24 Cans of beer per week     Comment: weekly   • Drug use: No        Objective     Vital Signs:   Vitals:    11/21/18 1455   BP: 117/76  "  Pulse: 70   Temp: 98.2 °F (36.8 °C)   SpO2: 98%   Weight: 85.6 kg (188 lb 12.8 oz)   Height: 180.3 cm (71\")       Physical Exam:   General Appearance:    Alert, cooperative, in no acute distress   Head:    Normocephalic, without obvious abnormality, atraumatic   Eyes:            Lids and lashes normal, conjunctivae and sclerae normal, no   icterus, no pallor, corneas clear, PERRLA   Ears:    Ears appear intact with no abnormalities noted   Throat:   No oral lesions, no thrush, oral mucosa moist   Neck:   No adenopathy, supple, trachea midline, no thyromegaly, no   carotid bruit, no JVD   Lungs:     Clear to auscultation,respirations regular, even and                  unlabored    Heart:    Regular rhythm and normal rate, normal S1 and S2, no            murmur, no gallop, no rub, no click   Chest Wall:    No abnormalities observed   Abdomen:     Normal bowel sounds, no masses, no organomegaly, soft        non-tender, non-distended, no guarding, no rebound                Tenderness, there is evidence of a diastases present in the midline with no evidence of obvious hernia    Extremities:   Moves all extremities well, no edema, no cyanosis, no             redness   Pulses:   Pulses palpable and equal bilaterally   Skin:   No bleeding, bruising or rash   Lymph nodes:   No palpable adenopathy   Neurologic:   Cranial nerves 2 - 12 grossly intact, sensation intact, DTR       present and equal bilaterally     Results Review:   I reviewed the patient's new clinical results.  I reviewed the patient's new imaging results and agree with the interpretation.  I reviewed the patient's other test results and agree with the interpretation    Assessment/Plan     1. Abdominal wall bulge    2. Diastasis recti        In short, I did have a detailed and extensive discussion with the patient in the office today.  There is no evidence of abdominal wall hernia but rather a diastases recti.  Ultrasound showed diastases only.  I have told " the patient that he needs no surgical intervention and I need to see the patient back in the office only if he has further problems.    I discussed the patients findings and my recommendations with patient.    Review of Systems was reviewed and confirmed as accurate today.    Electronically signed by Shyam Gallardo MD  11/21/18      .

## 2018-11-28 ENCOUNTER — APPOINTMENT (OUTPATIENT)
Dept: ULTRASOUND IMAGING | Facility: HOSPITAL | Age: 58
End: 2018-11-28

## 2018-12-10 RX ORDER — AMLODIPINE BESYLATE 5 MG/1
TABLET ORAL
Qty: 90 TABLET | Refills: 2 | Status: SHIPPED | OUTPATIENT
Start: 2018-12-10 | End: 2019-03-13 | Stop reason: SDUPTHER

## 2019-01-18 RX ORDER — ESCITALOPRAM OXALATE 10 MG/1
10 TABLET ORAL DAILY
Qty: 90 TABLET | Refills: 3 | Status: SHIPPED | OUTPATIENT
Start: 2019-01-18 | End: 2020-02-19 | Stop reason: SDUPTHER

## 2019-02-11 RX ORDER — SIMVASTATIN 80 MG
TABLET ORAL
Qty: 90 TABLET | Refills: 1 | Status: SHIPPED | OUTPATIENT
Start: 2019-02-11 | End: 2019-08-09 | Stop reason: SDUPTHER

## 2019-03-14 RX ORDER — AMLODIPINE BESYLATE 5 MG/1
TABLET ORAL
Qty: 90 TABLET | Refills: 1 | Status: SHIPPED | OUTPATIENT
Start: 2019-03-14 | End: 2019-12-27

## 2019-04-08 ENCOUNTER — OFFICE VISIT (OUTPATIENT)
Dept: INTERNAL MEDICINE | Facility: CLINIC | Age: 59
End: 2019-04-08

## 2019-04-08 VITALS
WEIGHT: 183.8 LBS | SYSTOLIC BLOOD PRESSURE: 132 MMHG | HEART RATE: 66 BPM | BODY MASS INDEX: 25.63 KG/M2 | TEMPERATURE: 97.9 F | OXYGEN SATURATION: 98 % | DIASTOLIC BLOOD PRESSURE: 79 MMHG

## 2019-04-08 DIAGNOSIS — I10 ESSENTIAL HYPERTENSION: Primary | ICD-10-CM

## 2019-04-08 DIAGNOSIS — E78.5 HYPERLIPIDEMIA LDL GOAL <70: ICD-10-CM

## 2019-04-08 DIAGNOSIS — G47.09 OTHER INSOMNIA: ICD-10-CM

## 2019-04-08 DIAGNOSIS — I25.10 MULTIPLE VESSEL CORONARY ARTERY DISEASE: ICD-10-CM

## 2019-04-08 LAB
BILIRUB BLD-MCNC: NEGATIVE MG/DL
CLARITY, POC: CLEAR
COLOR UR: YELLOW
GLUCOSE UR STRIP-MCNC: NEGATIVE MG/DL
KETONES UR QL: NEGATIVE
LEUKOCYTE EST, POC: NEGATIVE
NITRITE UR-MCNC: NEGATIVE MG/ML
PH UR: 6.5 [PH] (ref 5–8)
PROT UR STRIP-MCNC: NEGATIVE MG/DL
RBC # UR STRIP: NEGATIVE /UL
SP GR UR: 1.01 (ref 1–1.03)
UROBILINOGEN UR QL: NORMAL

## 2019-04-08 PROCEDURE — 81003 URINALYSIS AUTO W/O SCOPE: CPT | Performed by: INTERNAL MEDICINE

## 2019-04-08 PROCEDURE — 99214 OFFICE O/P EST MOD 30 MIN: CPT | Performed by: INTERNAL MEDICINE

## 2019-04-08 RX ORDER — TEMAZEPAM 15 MG/1
15 CAPSULE ORAL NIGHTLY PRN
Qty: 30 CAPSULE | Refills: 0 | Status: SHIPPED | OUTPATIENT
Start: 2019-04-08 | End: 2022-06-02

## 2019-04-08 NOTE — PROGRESS NOTES
Subjective  Johnny Crow is a 58 y.o. male    HPI coming in for follow-up patient with coronary artery disease with hypertension hyperlipidemia has started smoking again has occasional heartburn indigestion.  Recently came back from [place] where he did do excessive alcohol drinking.      The following portions of the patient's history were reviewed and updated as appropriate: allergies, current medications, past family history, past medical history, past social history, past surgical history, and problem list.     Review of Systems   Constitutional: Negative.  Negative for activity change, appetite change, fatigue and fever.   HENT: Negative for congestion, ear discharge, ear pain and trouble swallowing.    Eyes: Negative for photophobia and visual disturbance.   Respiratory: Negative for cough and shortness of breath.    Cardiovascular: Negative for chest pain and palpitations.   Gastrointestinal: Negative for abdominal distention, abdominal pain, constipation, diarrhea, nausea and vomiting.   Endocrine: Negative.    Genitourinary: Negative for dysuria, hematuria and urgency.   Musculoskeletal: Positive for arthralgias. Negative for back pain, joint swelling and myalgias.   Skin: Negative for color change and rash.   Allergic/Immunologic: Negative.    Neurological: Negative for dizziness, weakness, light-headedness and headaches.   Hematological: Negative for adenopathy. Does not bruise/bleed easily.   Psychiatric/Behavioral: Negative for agitation, confusion and dysphoric mood. The patient is not nervous/anxious.        Visit Vitals  /79 Comment: Automatic   Pulse 66   Temp 97.9 °F (36.6 °C) (Temporal)   Wt 83.4 kg (183 lb 12.8 oz)   SpO2 98%   BMI 25.63 kg/m²       Objective  Physical Exam   Constitutional: He is oriented to person, place, and time. He appears well-developed and well-nourished. No distress.   HENT:   Nose: Nose normal.   Mouth/Throat: Oropharynx is clear and moist.   Eyes: Conjunctivae  and EOM are normal. No scleral icterus.   Neck: No tracheal deviation present. No thyromegaly present.   Cardiovascular: Normal rate and regular rhythm. Exam reveals no friction rub.   No murmur heard.  Pulmonary/Chest: No respiratory distress. He has no wheezes. He has no rales.   Abdominal: Soft. He exhibits no distension and no mass. There is no tenderness. There is no guarding.   Musculoskeletal: Normal range of motion. He exhibits deformity.   Lymphadenopathy:     He has no cervical adenopathy.   Neurological: He is alert and oriented to person, place, and time. He has normal reflexes. No cranial nerve deficit. Coordination normal.   Skin: Skin is warm and dry. No rash noted. No erythema.   Psychiatric: He has a normal mood and affect. His behavior is normal. Judgment and thought content normal.       Diagnoses and all orders for this visit:    Essential hypertensionContinue with the dietary restrictions and low-sodium diet    Other insomnia counseled about sleep hygiene temazepam as needed  -     temazepam (RESTORIL) 15 MG capsule; Take 1 capsule by mouth At Night As Needed for Sleep.    Hyperlipidemia LDL goal <70 continue with the statins follow lipid profile    Multiple vessel coronary artery disease counseled about smoking cessation continue with statins, aspirin

## 2019-04-12 RX ORDER — METOPROLOL SUCCINATE 50 MG/1
TABLET, EXTENDED RELEASE ORAL
Qty: 90 TABLET | Refills: 3 | Status: SHIPPED | OUTPATIENT
Start: 2019-04-12 | End: 2020-04-27 | Stop reason: SDUPTHER

## 2019-05-03 RX ORDER — ISOSORBIDE MONONITRATE 30 MG/1
TABLET, EXTENDED RELEASE ORAL
Qty: 90 TABLET | Refills: 0 | Status: SHIPPED | OUTPATIENT
Start: 2019-05-03 | End: 2019-05-11 | Stop reason: SDUPTHER

## 2019-05-13 RX ORDER — ISOSORBIDE MONONITRATE 30 MG/1
TABLET, EXTENDED RELEASE ORAL
Qty: 90 TABLET | Refills: 1 | Status: SHIPPED | OUTPATIENT
Start: 2019-05-13 | End: 2019-11-18 | Stop reason: SDUPTHER

## 2019-08-05 RX ORDER — OMEPRAZOLE 20 MG/1
20 CAPSULE, DELAYED RELEASE ORAL DAILY
Qty: 90 CAPSULE | Refills: 0 | Status: SHIPPED | OUTPATIENT
Start: 2019-08-05 | End: 2019-11-12 | Stop reason: SDUPTHER

## 2019-08-09 RX ORDER — SIMVASTATIN 80 MG
80 TABLET ORAL DAILY
Qty: 30 TABLET | Refills: 0 | Status: SHIPPED | OUTPATIENT
Start: 2019-08-09 | End: 2019-12-12 | Stop reason: SDUPTHER

## 2019-10-10 ENCOUNTER — OFFICE VISIT (OUTPATIENT)
Dept: INTERNAL MEDICINE | Facility: CLINIC | Age: 59
End: 2019-10-10

## 2019-10-10 VITALS
BODY MASS INDEX: 25.31 KG/M2 | WEIGHT: 180.8 LBS | OXYGEN SATURATION: 99 % | SYSTOLIC BLOOD PRESSURE: 129 MMHG | TEMPERATURE: 97.4 F | HEART RATE: 74 BPM | DIASTOLIC BLOOD PRESSURE: 77 MMHG | HEIGHT: 71 IN

## 2019-10-10 DIAGNOSIS — R63.4 WEIGHT LOSS, UNINTENTIONAL: ICD-10-CM

## 2019-10-10 DIAGNOSIS — Z12.11 SCREEN FOR COLON CANCER: ICD-10-CM

## 2019-10-10 DIAGNOSIS — Z23 NEED FOR IMMUNIZATION AGAINST INFLUENZA: Primary | ICD-10-CM

## 2019-10-10 DIAGNOSIS — Z87.891 PERSONAL HISTORY OF TOBACCO USE, PRESENTING HAZARDS TO HEALTH: ICD-10-CM

## 2019-10-10 DIAGNOSIS — I10 ESSENTIAL HYPERTENSION: ICD-10-CM

## 2019-10-10 DIAGNOSIS — J43.1 PANLOBULAR EMPHYSEMA (HCC): ICD-10-CM

## 2019-10-10 PROCEDURE — 90653 IIV ADJUVANT VACCINE IM: CPT | Performed by: INTERNAL MEDICINE

## 2019-10-10 PROCEDURE — G0008 ADMIN INFLUENZA VIRUS VAC: HCPCS | Performed by: INTERNAL MEDICINE

## 2019-10-10 PROCEDURE — G0439 PPPS, SUBSEQ VISIT: HCPCS | Performed by: INTERNAL MEDICINE

## 2019-10-10 PROCEDURE — 96160 PT-FOCUSED HLTH RISK ASSMT: CPT | Performed by: INTERNAL MEDICINE

## 2019-10-10 PROCEDURE — 99396 PREV VISIT EST AGE 40-64: CPT | Performed by: INTERNAL MEDICINE

## 2019-10-10 RX ORDER — TEMAZEPAM 15 MG/1
15 CAPSULE ORAL NIGHTLY PRN
Qty: 30 CAPSULE | Refills: 0 | Status: CANCELLED | OUTPATIENT
Start: 2019-10-10

## 2019-10-10 NOTE — PROGRESS NOTES
The ABCs of the Annual Wellness Visit  Subsequent Medicare Wellness Visit    Chief Complaint   Patient presents with   • Medicare Wellness-subsequent       Subjective   History of Present Illness:  Johnny Crow is a 59 y.o. male with a history of hypertension, GERD, hyperlipidemia, and weight loss who presents for a Subsequent Medicare Wellness Visit.    HEALTH RISK ASSESSMENT    Recent Hospitalizations:  No hospitalization(s) within the last year.    Current Medical Providers:  Patient Care Team:  Sagar Cid MD as PCP - General  Shyam Gallardo MD as Consulting Physician (General Surgery)    Smoking Status:  Social History     Tobacco Use   Smoking Status Former Smoker   • Packs/day: 1.50   Smokeless Tobacco Former User   • Types: Snuff   • Quit date: 2017   Tobacco Comment    quit smoking early in 2015 and picked back up smoking in the fall of 2015. Pt now says he smokes 2 cigarettes daily       Alcohol Consumption:  Social History     Substance and Sexual Activity   Alcohol Use Yes   • Alcohol/week: 14.4 oz   • Types: 24 Cans of beer per week    Comment: weekly       Depression Screen:   PHQ-2/PHQ-9 Depression Screening 10/10/2019   Little interest or pleasure in doing things 0   Feeling down, depressed, or hopeless 0   Total Score 0       Fall Risk Screen:  STEADI Fall Risk Assessment has not been completed.    Health Habits and Functional and Cognitive Screening:  Functional & Cognitive Status 10/10/2019   Do you have difficulty preparing food and eating? No   Do you have difficulty bathing yourself, getting dressed or grooming yourself? No   Do you have difficulty using the toilet? No   Do you have difficulty moving around from place to place? No   Do you have trouble with steps or getting out of a bed or a chair? No   Current Diet Well Balanced Diet   Dental Exam Up to date   Eye Exam Up to date   Exercise (times per week) 0 times per week   Current Exercise Activities Include No Regular Exercise    Do you need help using the phone?  No   Are you deaf or do you have serious difficulty hearing?  No   Do you need help with transportation? No   Do you need help shopping? No   Do you need help preparing meals?  No   Do you need help with housework?  No   Do you need help with laundry? No   Do you need help taking your medications? No   Do you need help managing money? No   Do you ever drive or ride in a car without wearing a seat belt? No   Have you felt unusual stress, anger or loneliness in the last month? No   Who do you live with? Spouse   If you need help, do you have trouble finding someone available to you? No   Have you been bothered in the last four weeks by sexual problems? No   Do you have difficulty concentrating, remembering or making decisions? No         Does the patient have evidence of cognitive impairment? No    Asprin use counseling:Taking ASA appropriately as indicated    Age-appropriate Screening Schedule:  Refer to the list below for future screening recommendations based on patient's age, sex and/or medical conditions. Orders for these recommended tests are listed in the plan section. The patient has been provided with a written plan.    Health Maintenance   Topic Date Due   • COLONOSCOPY  06/21/2019   • LIPID PANEL  07/26/2019   • TDAP/TD VACCINES (1 - Tdap) 10/10/2019 (Originally 6/14/1979)   • ZOSTER VACCINE (1 of 2) 10/10/2029 (Originally 6/14/2010)   • INFLUENZA VACCINE  Completed          The following portions of the patient's history were reviewed and updated as appropriate: allergies, current medications, past family history, past medical history, past social history, past surgical history and problem list.    Outpatient Medications Prior to Visit   Medication Sig Dispense Refill   • albuterol (PROVENTIL HFA;VENTOLIN HFA) 108 (90 Base) MCG/ACT inhaler Inhale 2 puffs Every 4 (Four) Hours As Needed for Wheezing. 1 inhaler 11   • amLODIPine (NORVASC) 5 MG tablet TAKE ONE TABLET BY  MOUTH ONCE DAILY 90 tablet 1   • aspirin 325 MG tablet Take 1 tablet by mouth daily.     • escitalopram (LEXAPRO) 10 MG tablet Take 1 tablet by mouth Daily. 90 tablet 3   • isosorbide mononitrate (IMDUR) 30 MG 24 hr tablet take 1 tablet by mouth once daily 90 tablet 1   • metoprolol succinate XL (TOPROL-XL) 50 MG 24 hr tablet take 1 tablet by mouth once daily 90 tablet 3   • nitroglycerin (NITROSTAT) 0.4 MG SL tablet take 1 tablet by mouth EVERY 5 MINUTES if needed for chest pain 25 tablet 4   • omeprazole (priLOSEC) 20 MG capsule Take 1 capsule by mouth Daily. 90 capsule 0   • simvastatin (ZOCOR) 80 MG tablet Take 1 tablet by mouth Daily. Please call to reschedule missed appt for further refills 30 tablet 0   • temazepam (RESTORIL) 15 MG capsule Take 1 capsule by mouth At Night As Needed for Sleep. 30 capsule 0   • CHANTIX STARTING MONTH HOUSTON 0.5 MG X 11 & 1 MG X 42 tablet   0     No facility-administered medications prior to visit.        Patient Active Problem List   Diagnosis   • Anxiety   • Multiple vessel coronary artery disease   • Hyperlipidemia LDL goal <70   • Gastroesophageal reflux disease without esophagitis   • Essential hypertension   • Emphysema/COPD (CMS/HCC)   • Inflammatory liver disease   • Multiple pulmonary nodules   • Malignant neoplasm of skin   • Tobacco use       Advanced Care Planning:  Patient does not have an advance directive - information provided to the patient today    Review of Systems   Constitutional: Positive for fatigue and unexpected weight change. Negative for appetite change and fever.   HENT: Negative for trouble swallowing.    Eyes: Negative for visual disturbance.   Respiratory: Positive for cough and wheezing. Negative for shortness of breath.    Cardiovascular: Negative for chest pain, palpitations and leg swelling.   Gastrointestinal: Positive for diarrhea. Negative for abdominal pain, blood in stool and constipation.        Diarrhea 3-4x/day x 3 months   Genitourinary:  "Negative for difficulty urinating.   Musculoskeletal: Negative for arthralgias, back pain and joint swelling.   Neurological: Negative for dizziness, syncope, weakness, light-headedness and headaches.   Psychiatric/Behavioral: Negative for dysphoric mood and sleep disturbance. The patient is not nervous/anxious.        Compared to one year ago, the patient feels his physical health is the same.  Compared to one year ago, the patient feels his mental health is the same.    Reviewed chart for potential of high risk medication in the elderly: yes  Reviewed chart for potential of harmful drug interactions in the elderly:yes    Objective         Vitals:    10/10/19 1334   BP: 129/77  Comment: Automatic   Pulse: 74   Temp: 97.4 °F (36.3 °C)   TempSrc: Temporal   SpO2: 99%   Weight: 82 kg (180 lb 12.8 oz)   Height: 180.3 cm (71\")   PainSc: 0-No pain       Body mass index is 25.22 kg/m².  Discussed the patient's BMI with him. The BMI is in the acceptable range.    Physical Exam   Constitutional: He is oriented to person, place, and time. He appears well-developed and well-nourished.   HENT:   Mouth/Throat: Oropharynx is clear and moist.   Eyes: Pupils are equal, round, and reactive to light.   Neck: Trachea normal and normal range of motion. Carotid bruit is not present. No thyromegaly present.   Cardiovascular: Normal rate, regular rhythm, normal heart sounds and intact distal pulses.   No murmur heard.  Pulmonary/Chest: Effort normal and breath sounds normal. No respiratory distress. He has no wheezes.   Abdominal: Soft. Bowel sounds are normal. He exhibits no distension. There is no tenderness.   Musculoskeletal: Normal range of motion. He exhibits deformity. He exhibits no edema or tenderness.   Neurological: He is alert and oriented to person, place, and time.   Skin: Skin is warm and dry. Capillary refill takes less than 2 seconds.   Psychiatric: He has a normal mood and affect. His behavior is normal. Judgment and " thought content normal.   Nursing note and vitals reviewed.            Assessment/Plan   Medicare Risks and Personalized Health Plan  CMS Preventative Services Quick Reference  Advance Directive Discussion  Cardiovascular risk  Immunizations Discussed/Encouraged (specific immunizations; Influenza )  Lung Cancer Risk    The above risks/problems have been discussed with the patient.  Pertinent information has been shared with the patient in the After Visit Summary.  Follow up plans and orders are seen below in the Assessment/Plan Section.    Diagnoses and all orders for this visit:    1. Need for immunization against influenza (Primary)  -     Fluad Tri 65yr ([id number])    2. Panlobular emphysema (CMS/HCC)  - Currently uses Albuterol sparingly. Smoking cessation strongly encouraged.     2. Tobacco use  - Encouraged smoking cessation. Denies nicotine replacement therapy at this time. Low dose CT scan ordered.    3. Essential hypertension  - Stable. Continue with current medication regimen which he is compliant. Encouraged low salt diet and cardiovascular exercise.     4. Weight loss  - CBC, CMP, celiac panel, and TSH ordered. Has lost 9 pounds since 2018 and continues to drop unintentionally.     5. Screen for colon cancer  - Referral to gastroenterology for colonoscopy made.     Follow Up:  No Follow-up on file.     An After Visit Summary and PPPS were given to the patient.       1.  This note accurately reflects work and decisions made by me.

## 2019-10-10 NOTE — PATIENT INSTRUCTIONS
Medicare Wellness  Personal Prevention Plan of Service     Date of Office Visit:  10/10/2019  Encounter Provider:  Sagar Cid MD  Place of Service:  [facility]  Patient Name: Johnny Crow  :  1960    As part of the Medicare Wellness portion of your visit today, we are providing you with this personalized preventive plan of services (PPPS). This plan is based upon recommendations of the [place] Preventive Services Task Force (USPSTF) and the Advisory Committee on Immunization Practices (ACIP).    This lists the preventive care services that should be considered, and provides dates of when you are due. Items listed as completed are up-to-date and do not require any further intervention.    Health Maintenance   Topic Date Due   • HEPATITIS C SCREENING  2016   • COLONOSCOPY  2019   • LIPID PANEL  2019   • TDAP/TD VACCINES (1 - Tdap) 10/10/2019 (Originally 1979)   • ZOSTER VACCINE (1 of 2) 10/10/2029 (Originally 2010)   • MEDICARE ANNUAL WELLNESS  10/10/2020   • INFLUENZA VACCINE  Completed       Orders Placed This Encounter   Procedures   • CT Chest Low Dose Wo     Standing Status:   Future     Standing Expiration Date:   10/10/2020     Order Specific Question:   The patient is age 55-77:     Answer:   59     Order Specific Question:   The patient is a current smoker?     Answer:   Yes     Order Specific Question:   The patient has a smoking history of 30 pack-years or greater:     Answer:   Yes     Order Specific Question:   Actual pack - year smoking history (number):     Answer:   40     Order Specific Question:   Has the Patient had a Chest CT scan within the past 12 months?     Answer:   No     Order Specific Question:   Does the patient have any clinical signs/symptoms of lung cancer?     Answer:   No     Order Specific Question:   The patient was engaged in shared decision-making for this test:     Answer:   Yes   • Fluad Tri 65yr  ([id number])   • CBC (No Diff)   • Comprehensive Metabolic Panel   • TSH   • Celiac Disease Panel   • Ambulatory Referral to Gastroenterology     Referral Priority:   Routine     Referral Type:   Consultation     Referral Reason:   Specialty Services Required     Referred to Provider:   Marlo Guthrie MD     Requested Specialty:   Gastroenterology     Number of Visits Requested:   1       No Follow-up on file.

## 2019-10-11 LAB
ALBUMIN SERPL-MCNC: 4.4 G/DL (ref 3.5–5.2)
ALBUMIN/GLOB SERPL: 1.4 G/DL
ALP SERPL-CCNC: 90 U/L (ref 39–117)
ALT SERPL-CCNC: 29 U/L (ref 1–41)
AST SERPL-CCNC: 37 U/L (ref 1–40)
BILIRUB SERPL-MCNC: 0.5 MG/DL (ref 0.2–1.2)
BUN SERPL-MCNC: 6 MG/DL (ref 6–20)
BUN/CREAT SERPL: 8.1 (ref 7–25)
CALCIUM SERPL-MCNC: 9.8 MG/DL (ref 8.6–10.5)
CHLORIDE SERPL-SCNC: 103 MMOL/L (ref 98–107)
CO2 SERPL-SCNC: 27.8 MMOL/L (ref 22–29)
CREAT SERPL-MCNC: 0.74 MG/DL (ref 0.76–1.27)
ENDOMYSIUM IGA SER QL: NEGATIVE
ERYTHROCYTE [DISTWIDTH] IN BLOOD BY AUTOMATED COUNT: 12.2 % (ref 12.3–15.4)
GLOBULIN SER CALC-MCNC: 3.2 GM/DL
GLUCOSE SERPL-MCNC: 80 MG/DL (ref 65–99)
HCT VFR BLD AUTO: 45.8 % (ref 37.5–51)
HGB BLD-MCNC: 15.9 G/DL (ref 13–17.7)
IGA SERPL-MCNC: 489 MG/DL (ref 90–386)
MCH RBC QN AUTO: 34 PG (ref 26.6–33)
MCHC RBC AUTO-ENTMCNC: 34.7 G/DL (ref 31.5–35.7)
MCV RBC AUTO: 98.1 FL (ref 79–97)
PLATELET # BLD AUTO: 261 10*3/MM3 (ref 140–450)
POTASSIUM SERPL-SCNC: 4.7 MMOL/L (ref 3.5–5.2)
PROT SERPL-MCNC: 7.6 G/DL (ref 6–8.5)
RBC # BLD AUTO: 4.67 10*6/MM3 (ref 4.14–5.8)
SODIUM SERPL-SCNC: 143 MMOL/L (ref 136–145)
TSH SERPL DL<=0.005 MIU/L-ACNC: 0.73 UIU/ML (ref 0.27–4.2)
TTG IGA SER-ACNC: <2 U/ML (ref 0–3)
WBC # BLD AUTO: 6.66 10*3/MM3 (ref 3.4–10.8)

## 2019-10-14 ENCOUNTER — HOSPITAL ENCOUNTER (OUTPATIENT)
Dept: CT IMAGING | Facility: HOSPITAL | Age: 59
Discharge: HOME | End: 2019-10-14
Admitting: INTERNAL MEDICINE

## 2019-10-14 DIAGNOSIS — Z87.891 PERSONAL HISTORY OF TOBACCO USE, PRESENTING HAZARDS TO HEALTH: ICD-10-CM

## 2019-10-14 PROCEDURE — G0297 LDCT FOR LUNG CA SCREEN: HCPCS

## 2019-10-15 ENCOUNTER — TELEPHONE (OUTPATIENT)
Dept: INTERNAL MEDICINE | Facility: CLINIC | Age: 59
End: 2019-10-15

## 2019-10-16 NOTE — TELEPHONE ENCOUNTER
Emphysema, Scarring is present . No suspicious pulmonary nodules are  identified. Calcified granulomas are present in the right lower lobe. No cancer detected

## 2019-10-22 ENCOUNTER — HOSPITAL ENCOUNTER (EMERGENCY)
Facility: HOSPITAL | Age: 59
Discharge: HOME | End: 2019-10-22
Attending: EMERGENCY MEDICINE | Admitting: EMERGENCY MEDICINE

## 2019-10-22 ENCOUNTER — APPOINTMENT (OUTPATIENT)
Dept: CT IMAGING | Facility: HOSPITAL | Age: 59
End: 2019-10-22

## 2019-10-22 VITALS
OXYGEN SATURATION: 96 % | RESPIRATION RATE: 20 BRPM | BODY MASS INDEX: 25.59 KG/M2 | HEIGHT: 71 IN | TEMPERATURE: 98.6 F | DIASTOLIC BLOOD PRESSURE: 81 MMHG | WEIGHT: 182.8 LBS | SYSTOLIC BLOOD PRESSURE: 130 MMHG | HEART RATE: 81 BPM

## 2019-10-22 DIAGNOSIS — S09.90XA CLOSED HEAD INJURY, INITIAL ENCOUNTER: Primary | ICD-10-CM

## 2019-10-22 LAB
ALBUMIN SERPL-MCNC: 3.9 G/DL (ref 3.5–5.2)
ALBUMIN/GLOB SERPL: 1.1 G/DL
ALP SERPL-CCNC: 77 U/L (ref 39–117)
ALT SERPL W P-5'-P-CCNC: 34 U/L (ref 1–41)
ANION GAP SERPL CALCULATED.3IONS-SCNC: 11.7 MMOL/L (ref 5–15)
AST SERPL-CCNC: 49 U/L (ref 1–40)
BASOPHILS # BLD AUTO: 0.08 10*3/MM3 (ref 0–0.2)
BASOPHILS NFR BLD AUTO: 1.3 % (ref 0–1.5)
BILIRUB SERPL-MCNC: 0.8 MG/DL (ref 0.2–1.2)
BUN BLD-MCNC: 5 MG/DL (ref 6–20)
BUN/CREAT SERPL: 7.5 (ref 7–25)
CALCIUM SPEC-SCNC: 9.2 MG/DL (ref 8.6–10.5)
CHLORIDE SERPL-SCNC: 105 MMOL/L (ref 98–107)
CO2 SERPL-SCNC: 22.3 MMOL/L (ref 22–29)
CREAT BLD-MCNC: 0.67 MG/DL (ref 0.76–1.27)
DEPRECATED RDW RBC AUTO: 41 FL (ref 37–54)
EOSINOPHIL # BLD AUTO: 0.16 10*3/MM3 (ref 0–0.4)
EOSINOPHIL NFR BLD AUTO: 2.5 % (ref 0.3–6.2)
ERYTHROCYTE [DISTWIDTH] IN BLOOD BY AUTOMATED COUNT: 11.8 % (ref 12.3–15.4)
GFR SERPL CREATININE-BSD FRML MDRD: 121 ML/MIN/1.73
GLOBULIN UR ELPH-MCNC: 3.5 GM/DL
GLUCOSE BLD-MCNC: 131 MG/DL (ref 65–99)
HCT VFR BLD AUTO: 43.8 % (ref 37.5–51)
HGB BLD-MCNC: 15.2 G/DL (ref 13–17.7)
IMM GRANULOCYTES # BLD AUTO: 0.02 10*3/MM3 (ref 0–0.05)
IMM GRANULOCYTES NFR BLD AUTO: 0.3 % (ref 0–0.5)
LYMPHOCYTES # BLD AUTO: 2.19 10*3/MM3 (ref 0.7–3.1)
LYMPHOCYTES NFR BLD AUTO: 34.4 % (ref 19.6–45.3)
MCH RBC QN AUTO: 32.9 PG (ref 26.6–33)
MCHC RBC AUTO-ENTMCNC: 34.7 G/DL (ref 31.5–35.7)
MCV RBC AUTO: 94.8 FL (ref 79–97)
MONOCYTES # BLD AUTO: 0.55 10*3/MM3 (ref 0.1–0.9)
MONOCYTES NFR BLD AUTO: 8.6 % (ref 5–12)
NEUTROPHILS # BLD AUTO: 3.36 10*3/MM3 (ref 1.7–7)
NEUTROPHILS NFR BLD AUTO: 52.9 % (ref 42.7–76)
NRBC BLD AUTO-RTO: 0 /100 WBC (ref 0–0.2)
PLATELET # BLD AUTO: 227 10*3/MM3 (ref 140–450)
PMV BLD AUTO: 9.4 FL (ref 6–12)
POTASSIUM BLD-SCNC: 3.8 MMOL/L (ref 3.5–5.2)
PROT SERPL-MCNC: 7.4 G/DL (ref 6–8.5)
RBC # BLD AUTO: 4.62 10*6/MM3 (ref 4.14–5.8)
SODIUM BLD-SCNC: 139 MMOL/L (ref 136–145)
WBC NRBC COR # BLD: 6.36 10*3/MM3 (ref 3.4–10.8)

## 2019-10-22 PROCEDURE — 96374 THER/PROPH/DIAG INJ IV PUSH: CPT

## 2019-10-22 PROCEDURE — 80053 COMPREHEN METABOLIC PANEL: CPT | Performed by: NURSE PRACTITIONER

## 2019-10-22 PROCEDURE — 85025 COMPLETE CBC W/AUTO DIFF WBC: CPT | Performed by: NURSE PRACTITIONER

## 2019-10-22 PROCEDURE — 99282 EMERGENCY DEPT VISIT SF MDM: CPT

## 2019-10-22 PROCEDURE — 25010000002 DIPHENHYDRAMINE PER 50 MG: Performed by: NURSE PRACTITIONER

## 2019-10-22 PROCEDURE — 70450 CT HEAD/BRAIN W/O DYE: CPT

## 2019-10-22 PROCEDURE — 96375 TX/PRO/DX INJ NEW DRUG ADDON: CPT

## 2019-10-22 PROCEDURE — 36415 COLL VENOUS BLD VENIPUNCTURE: CPT

## 2019-10-22 PROCEDURE — 25010000002 ONDANSETRON PER 1 MG: Performed by: NURSE PRACTITIONER

## 2019-10-22 PROCEDURE — 25010000002 KETOROLAC TROMETHAMINE PER 15 MG: Performed by: NURSE PRACTITIONER

## 2019-10-22 RX ORDER — KETOROLAC TROMETHAMINE 30 MG/ML
30 INJECTION, SOLUTION INTRAMUSCULAR; INTRAVENOUS ONCE
Status: COMPLETED | OUTPATIENT
Start: 2019-10-22 | End: 2019-10-22

## 2019-10-22 RX ORDER — ONDANSETRON 2 MG/ML
4 INJECTION INTRAMUSCULAR; INTRAVENOUS ONCE
Status: COMPLETED | OUTPATIENT
Start: 2019-10-22 | End: 2019-10-22

## 2019-10-22 RX ORDER — DIPHENHYDRAMINE HYDROCHLORIDE 50 MG/ML
25 INJECTION INTRAMUSCULAR; INTRAVENOUS ONCE
Status: COMPLETED | OUTPATIENT
Start: 2019-10-22 | End: 2019-10-22

## 2019-10-22 RX ORDER — SODIUM CHLORIDE 0.9 % (FLUSH) 0.9 %
10 SYRINGE (ML) INJECTION AS NEEDED
Status: DISCONTINUED | OUTPATIENT
Start: 2019-10-22 | End: 2019-10-22 | Stop reason: HOSPADM

## 2019-10-22 RX ADMIN — ONDANSETRON 4 MG: 2 INJECTION INTRAMUSCULAR; INTRAVENOUS at 11:39

## 2019-10-22 RX ADMIN — SODIUM CHLORIDE 1000 ML: 9 INJECTION, SOLUTION INTRAVENOUS at 11:39

## 2019-10-22 RX ADMIN — KETOROLAC TROMETHAMINE 30 MG: 30 INJECTION, SOLUTION INTRAMUSCULAR at 11:39

## 2019-10-22 RX ADMIN — DIPHENHYDRAMINE HYDROCHLORIDE 25 MG: 50 INJECTION, SOLUTION INTRAMUSCULAR; INTRAVENOUS at 11:39
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
## 2019-10-31 ENCOUNTER — OFFICE VISIT (OUTPATIENT)
Dept: INTERNAL MEDICINE | Facility: CLINIC | Age: 59
End: 2019-10-31

## 2019-10-31 VITALS
BODY MASS INDEX: 25.54 KG/M2 | SYSTOLIC BLOOD PRESSURE: 129 MMHG | WEIGHT: 182.4 LBS | HEART RATE: 82 BPM | HEIGHT: 71 IN | DIASTOLIC BLOOD PRESSURE: 85 MMHG | TEMPERATURE: 97.5 F | OXYGEN SATURATION: 98 %

## 2019-10-31 DIAGNOSIS — G44.329 CHRONIC POST-TRAUMATIC HEADACHE, NOT INTRACTABLE: Primary | ICD-10-CM

## 2019-10-31 PROCEDURE — 99213 OFFICE O/P EST LOW 20 MIN: CPT | Performed by: INTERNAL MEDICINE
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
## 2019-11-05 ENCOUNTER — APPOINTMENT (OUTPATIENT)
Dept: CT IMAGING | Facility: HOSPITAL | Age: 59
End: 2019-11-05

## 2019-11-05 ENCOUNTER — HOSPITAL ENCOUNTER (EMERGENCY)
Facility: HOSPITAL | Age: 59
Discharge: HOME | End: 2019-11-05
Attending: EMERGENCY MEDICINE | Admitting: EMERGENCY MEDICINE

## 2019-11-05 VITALS
RESPIRATION RATE: 16 BRPM | BODY MASS INDEX: 25.81 KG/M2 | WEIGHT: 184.4 LBS | DIASTOLIC BLOOD PRESSURE: 113 MMHG | SYSTOLIC BLOOD PRESSURE: 147 MMHG | HEART RATE: 88 BPM | OXYGEN SATURATION: 97 % | HEIGHT: 71 IN | TEMPERATURE: 98.1 F

## 2019-11-05 DIAGNOSIS — S39.012A STRAIN OF LUMBAR REGION, INITIAL ENCOUNTER: Primary | ICD-10-CM

## 2019-11-05 PROCEDURE — 99282 EMERGENCY DEPT VISIT SF MDM: CPT

## 2019-11-05 PROCEDURE — 72131 CT LUMBAR SPINE W/O DYE: CPT

## 2019-11-05 RX ORDER — TIZANIDINE 4 MG/1
4 TABLET ORAL EVERY 8 HOURS PRN
Status: DISCONTINUED | OUTPATIENT
Start: 2019-11-05 | End: 2019-11-05 | Stop reason: HOSPADM

## 2019-11-05 RX ORDER — NAPROXEN 500 MG/1
500 TABLET ORAL 2 TIMES DAILY PRN
Qty: 20 TABLET | Refills: 0 | Status: SHIPPED | OUTPATIENT
Start: 2019-11-05 | End: 2019-11-20

## 2019-11-05 RX ORDER — TIZANIDINE HYDROCHLORIDE 4 MG/1
4 CAPSULE, GELATIN COATED ORAL 3 TIMES DAILY PRN
Qty: 15 CAPSULE | Refills: 0 | Status: SHIPPED | OUTPATIENT
Start: 2019-11-05 | End: 2021-05-03

## 2019-11-05 NOTE — ED PROVIDER NOTES
Subjective   59-year-old male presents to the ED with chief complaint of low back pain.  Patient is complaining of left-sided low back pain that has been intermittent for the last 3 weeks.  The pain is a dull ache that is sometimes exacerbated with certain movements.  He states it is not present all the time and he has no true known associated factors.  He denies loss of bowel or bladder control.  No urinary retention.  No numbness or tingling in his extremities.  Pain does not radiate.  No dysuria hematuria.  No fever chills.  No prior treatments relieving factors.  No other complaints at this time.            Review of Systems   Musculoskeletal: Positive for back pain.   All other systems reviewed and are negative.      Past Medical History:   Diagnosis Date   • Anxiety    • Arthritis    • Atypical chest pain 3/9/2017   • CAD, multiple vessel      History of cardiac stenting to the RCA September 2006 with subsequent cardiac catheterizations in 2010 and 2012 reporting minimal disease with patent stent.   • COPD (chronic obstructive pulmonary disease) (CMS/HCC)       Moderate obstruction with previous improvement on Spiriva;  Long-standing history of tobacco abuse; Bullous emphysema noted on CT scan of the chest with history of prior spontaneous pneumothorax.   • Dyslipidemia    • Emphysema of lung (CMS/HCC)    • Fatigue    • GERD (gastroesophageal reflux disease)    • Hydropneumothorax      CT scan of the chest 06/17/2014, reports A. probable chronic anterior left chest hydropneumothorax.   • Hypertension    • Leg pain     Normal KIMMY, 09/13/2006, at [facility].     • Myocardial infarction (CMS/HCC)    • Pulmonary nodules      CT scan of the chest 06/17/2014 reports scattered 2-3 mm noncalcified nodules in the right middle lobe and left lower lobe, previous CT scan of the chest 09/16/2011 reported multiple 2 mm right middle lobe nodules.   • Renal calculi    • Skin cancer    • Spontaneous pneumothorax      Secondary to bullous emphysema, x2 in 1997.       No Known Allergies    Past Surgical History:   Procedure Laterality Date   • BILATERAL BREAST REDUCTION     • CARDIAC CATHETERIZATION      Catheterization 12/21/2012 reports noncritical coronary artery disease and a widely patent right coronary artery stent.   • CARDIAC CATHETERIZATION N/A 5/26/2017    Procedure: Left Heart Cath;  Surgeon: Anai Sweet MD;  Location: [facility] CATH INVASIVE LOCATION;  Service:    • CARDIAC CATHETERIZATION  02/2018    Male clinic in [facility]   • COLONOSCOPY      5 years ago with    • CORONARY STENT PLACEMENT  09/12/2006   • ENDOSCOPY     • INGUINAL HERNIA REPAIR  2009   • PLEURAL SCARIFICATION Right     x 2  in 96 and 97   • POLYPECTOMY  2006    Status post polypectomy x2, noncancerous, spring 2006.       Family History   Problem Relation Age of Onset   • Heart disease Mother    • Diabetes Father    • Heart disease Father    • Cancer Sister         mass in lower intestine   • Heart disease Brother    • Liver disease Brother    • Cancer Brother         colon       Social History     Socioeconomic History   • Marital status:      Spouse name: Not on file   • Number of children: Not on file   • Years of education: Not on file   • Highest education level: Not on file   Tobacco Use   • Smoking status: Current Every Day Smoker     Packs/day: 1.50   • Smokeless tobacco: Former User     Types: Snuff     Quit date: 2017   Substance and Sexual Activity   • Alcohol use: Yes     Alcohol/week: 14.4 oz     Types: 24 Cans of beer per week     Comment: weekly   • Drug use: No   • Sexual activity: Defer           Objective   Physical Exam   Constitutional: He is oriented to person, place, and time. He appears well-developed and well-nourished. No distress.   HENT:   Head: Normocephalic and atraumatic.   Nose: Nose normal.   Eyes: Conjunctivae and EOM are normal. Pupils are equal, round, and reactive to light.    Cardiovascular: Normal rate, regular rhythm and intact distal pulses.   Pulmonary/Chest: Effort normal and breath sounds normal. No respiratory distress.   Abdominal: Soft. He exhibits no distension. There is no tenderness.   Musculoskeletal: He exhibits no edema or deformity.   Left lumbar paraspinal and SI joint tenderness to palpation.  No midline tenderness to palpation.   Neurological: He is alert and oriented to person, place, and time. No cranial nerve deficit. He exhibits normal muscle tone. Coordination normal.   Skin: He is not diaphoretic.   Nursing note and vitals reviewed.      Procedures           ED Course        Patient with reproducible left lower back pain.  Consistent with a lumbar strain and SI joint irritation.  No red flags of back pain.  Will discharge with anti-inflammatories and muscle relaxers.  Patient is agreeable to this plan.  Follow-up as needed.        MDM    Final diagnoses:   Strain of lumbar region, initial encounter              Mik Choudhury,   11/05/19 0945

## 2019-11-07 ENCOUNTER — TELEPHONE (OUTPATIENT)
Dept: INTERNAL MEDICINE | Facility: CLINIC | Age: 59
End: 2019-11-07

## 2019-11-07 DIAGNOSIS — M54.50 ACUTE MIDLINE LOW BACK PAIN WITHOUT SCIATICA: Primary | ICD-10-CM
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
## 2019-11-08 RX ORDER — OMEPRAZOLE 20 MG/1
20 CAPSULE, DELAYED RELEASE ORAL DAILY
Qty: 90 CAPSULE | Refills: 0 | OUTPATIENT
Start: 2019-11-08

## 2019-11-08 RX ORDER — SIMVASTATIN 80 MG
TABLET ORAL
Qty: 30 TABLET | Refills: 0 | OUTPATIENT
Start: 2019-11-08

## 2019-11-12 RX ORDER — OMEPRAZOLE 20 MG/1
20 CAPSULE, DELAYED RELEASE ORAL DAILY
Qty: 30 CAPSULE | Refills: 0 | Status: SHIPPED | OUTPATIENT
Start: 2019-11-12 | End: 2019-12-23

## 2019-11-18 ENCOUNTER — TELEPHONE (OUTPATIENT)
Dept: PEDIATRICS | Facility: OTHER | Age: 59
End: 2019-11-18

## 2019-11-18 RX ORDER — ISOSORBIDE MONONITRATE 30 MG/1
TABLET, EXTENDED RELEASE ORAL
Qty: 30 TABLET | Refills: 0 | Status: SHIPPED | OUTPATIENT
Start: 2019-11-18 | End: 2020-02-05
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
## 2019-11-18 NOTE — TELEPHONE ENCOUNTER
Discussed with Mukesh and his wife. They are aware that Dr. Cid is out of the country.     They have agreed to see Stacia Ngo on Wednesday

## 2019-11-20 ENCOUNTER — OFFICE VISIT (OUTPATIENT)
Dept: FAMILY MEDICINE CLINIC | Facility: CLINIC | Age: 59
End: 2019-11-20

## 2019-11-20 VITALS
DIASTOLIC BLOOD PRESSURE: 70 MMHG | BODY MASS INDEX: 25.34 KG/M2 | HEART RATE: 74 BPM | HEIGHT: 71 IN | OXYGEN SATURATION: 96 % | TEMPERATURE: 97.9 F | WEIGHT: 181 LBS | SYSTOLIC BLOOD PRESSURE: 120 MMHG

## 2019-11-20 DIAGNOSIS — G89.29 CHRONIC INTRACTABLE HEADACHE, UNSPECIFIED HEADACHE TYPE: ICD-10-CM

## 2019-11-20 DIAGNOSIS — F07.81 POST CONCUSSION SYNDROME: Primary | ICD-10-CM

## 2019-11-20 DIAGNOSIS — R51.9 CHRONIC INTRACTABLE HEADACHE, UNSPECIFIED HEADACHE TYPE: ICD-10-CM

## 2019-11-20 DIAGNOSIS — H92.02 LEFT EAR PAIN: ICD-10-CM

## 2019-11-20 DIAGNOSIS — M54.2 NECK PAIN ON LEFT SIDE: ICD-10-CM

## 2019-11-20 PROCEDURE — 96372 THER/PROPH/DIAG INJ SC/IM: CPT | Performed by: NURSE PRACTITIONER

## 2019-11-20 PROCEDURE — 99214 OFFICE O/P EST MOD 30 MIN: CPT | Performed by: NURSE PRACTITIONER

## 2019-11-20 RX ORDER — METHYLPREDNISOLONE 4 MG/1
TABLET ORAL
Qty: 21 TABLET | Refills: 0 | Status: SHIPPED | OUTPATIENT
Start: 2019-11-20 | End: 2019-12-05

## 2019-11-20 RX ORDER — KETOROLAC TROMETHAMINE 30 MG/ML
30 INJECTION, SOLUTION INTRAMUSCULAR; INTRAVENOUS ONCE
Status: COMPLETED | OUTPATIENT
Start: 2019-11-20 | End: 2019-11-20

## 2019-11-20 RX ADMIN — KETOROLAC TROMETHAMINE 30 MG: 30 INJECTION, SOLUTION INTRAMUSCULAR; INTRAVENOUS at 09:18

## 2019-11-20 NOTE — PATIENT INSTRUCTIONS
Cervical Strain and Sprain Rehab  Ask your health care provider which exercises are safe for you. Do exercises exactly as told by your health care provider and adjust them as directed. It is normal to feel mild stretching, pulling, tightness, or discomfort as you do these exercises, but you should stop right away if you feel sudden pain or your pain gets worse. Do not begin these exercises until told by your health care provider.  Stretching and range of motion exercises  These exercises warm up your muscles and joints and improve the movement and flexibility of your neck. These exercises also help to relieve pain, numbness, and tingling.  Exercise A: Cervical side bend    1. Using good posture, sit on a stable chair or stand up.  2. Without moving your shoulders, slowly tilt your left / right ear to your shoulder until you feel a stretch in your neck muscles. You should be looking straight ahead.  3. Hold for __________ seconds.  4. Repeat with the other side of your neck.  Repeat __________ times. Complete this exercise __________ times a day.  Exercise B: Cervical rotation    1. Using good posture, sit on a stable chair or stand up.  2. Slowly turn your head to the side as if you are looking over your left / right shoulder.  ? Keep your eyes level with the ground.  ? Stop when you feel a stretch along the side and the back of your neck.  3. Hold for __________ seconds.  4. Repeat this by turning to your other side.  Repeat __________ times. Complete this exercise __________ times a day.  Exercise C: Thoracic extension and pectoral stretch  1. Roll a towel or a small blanket so it is about 4 inches (10 cm) in diameter.  2. Lie down on your back on a firm surface.  3. Put the towel lengthwise, under your spine in the middle of your back. It should not be not under your shoulder blades. The towel should line up with your spine from your middle back to your lower back.  4. Put your hands behind your head and let your  elbows fall out to your sides.  5. Hold for __________ seconds.  Repeat __________ times. Complete this exercise __________ times a day.  Strengthening exercises  These exercises build strength and endurance in your neck. Endurance is the ability to use your muscles for a long time, even after your muscles get tired.  Exercise D: Upper cervical flexion, isometric  1. Lie on your back with a thin pillow behind your head and a small rolled-up towel under your neck.  2. Gently tuck your chin toward your chest and nod your head down to look toward your feet. Do not lift your head off the pillow.  3. Hold for __________ seconds.  4. Release the tension slowly. Relax your neck muscles completely before you repeat this exercise.  Repeat __________ times. Complete this exercise __________ times a day.  Exercise E: Cervical extension, isometric    1. Stand about 6 inches (15 cm) away from a wall, with your back facing the wall.  2. Place a soft object, about 6-8 inches (15-20 cm) in diameter, between the back of your head and the wall. A soft object could be a small pillow, a ball, or a folded towel.  3. Gently tilt your head back and press into the soft object. Keep your jaw and forehead relaxed.  4. Hold for __________ seconds.  5. Release the tension slowly. Relax your neck muscles completely before you repeat this exercise.  Repeat __________ times. Complete this exercise __________ times a day.  Posture and body mechanics  Body mechanics refers to the movements and positions of your body while you do your daily activities. Posture is part of body mechanics. Good posture and healthy body mechanics can help to relieve stress in your body's tissues and joints. Good posture means that your spine is in its natural S-curve position (your spine is neutral), your shoulders are pulled back slightly, and your head is not tipped forward. The following are general guidelines for applying improved posture and body mechanics to your  everyday activities.  Standing    · When standing, keep your spine neutral and keep your feet about hip-width apart. Keep a slight bend in your knees. Your ears, shoulders, and hips should line up.  · When you do a task in which you  one place for a long time, place one foot up on a stable object that is 2-4 inches (5-10 cm) high, such as a footstool. This helps keep your spine neutral.  Sitting    · When sitting, keep your spine neutral and your keep feet flat on the floor. Use a footrest, if necessary, and keep your thighs parallel to the floor. Avoid rounding your shoulders, and avoid tilting your head forward.  · When working at a desk or a computer, keep your desk at a height where your hands are slightly lower than your elbows. Slide your chair under your desk so you are close enough to maintain good posture.  · When working at a computer, place your monitor at a height where you are looking straight ahead and you do not have to tilt your head forward or downward to look at the screen.  Resting  When lying down and resting, avoid positions that are most painful for you. Try to support your neck in a neutral position. You can use a contour pillow or a small rolled-up towel. Your pillow should support your neck but not push on it.  This information is not intended to replace advice given to you by your health care provider. Make sure you discuss any questions you have with your health care provider.  Document Released: 12/18/2006 Document Revised: 08/24/2017 Document Reviewed: 11/23/2016  [employer] Patient Education © 2019 [employer]
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
## 2019-11-29 ENCOUNTER — HOSPITAL ENCOUNTER (OUTPATIENT)
Dept: MRI IMAGING | Facility: HOSPITAL | Age: 59
Discharge: HOME | End: 2019-11-29

## 2019-11-29 ENCOUNTER — HOSPITAL ENCOUNTER (OUTPATIENT)
Dept: MRI IMAGING | Facility: HOSPITAL | Age: 59
Discharge: HOME | End: 2019-11-29
Admitting: NURSE PRACTITIONER

## 2019-11-29 DIAGNOSIS — F07.81 POST CONCUSSION SYNDROME: ICD-10-CM

## 2019-11-29 DIAGNOSIS — G89.29 CHRONIC INTRACTABLE HEADACHE, UNSPECIFIED HEADACHE TYPE: ICD-10-CM

## 2019-11-29 DIAGNOSIS — R51.9 CHRONIC INTRACTABLE HEADACHE, UNSPECIFIED HEADACHE TYPE: ICD-10-CM

## 2019-11-29 DIAGNOSIS — M54.2 NECK PAIN ON LEFT SIDE: ICD-10-CM

## 2019-11-29 PROCEDURE — 70551 MRI BRAIN STEM W/O DYE: CPT

## 2019-11-29 PROCEDURE — 72141 MRI NECK SPINE W/O DYE: CPT

## 2019-12-03 ENCOUNTER — TREATMENT (OUTPATIENT)
Dept: PHYSICAL THERAPY | Facility: CLINIC | Age: 59
End: 2019-12-03

## 2019-12-03 DIAGNOSIS — M54.50 ACUTE BILATERAL LOW BACK PAIN WITHOUT SCIATICA: Primary | ICD-10-CM

## 2019-12-03 PROCEDURE — 97161 PT EVAL LOW COMPLEX 20 MIN: CPT | Performed by: PHYSICAL THERAPIST

## 2019-12-03 PROCEDURE — 97110 THERAPEUTIC EXERCISES: CPT | Performed by: PHYSICAL THERAPIST
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
## 2019-12-05 ENCOUNTER — OFFICE VISIT (OUTPATIENT)
Dept: CARDIOLOGY | Facility: CLINIC | Age: 59
End: 2019-12-05

## 2019-12-05 ENCOUNTER — OFFICE VISIT (OUTPATIENT)
Dept: GASTROENTEROLOGY | Facility: CLINIC | Age: 59
End: 2019-12-05

## 2019-12-05 VITALS
OXYGEN SATURATION: 98 % | HEART RATE: 84 BPM | WEIGHT: 179 LBS | DIASTOLIC BLOOD PRESSURE: 60 MMHG | BODY MASS INDEX: 25.06 KG/M2 | HEIGHT: 71 IN | SYSTOLIC BLOOD PRESSURE: 120 MMHG

## 2019-12-05 VITALS
HEART RATE: 75 BPM | TEMPERATURE: 98 F | WEIGHT: 180 LBS | SYSTOLIC BLOOD PRESSURE: 129 MMHG | DIASTOLIC BLOOD PRESSURE: 76 MMHG | BODY MASS INDEX: 25.2 KG/M2 | HEIGHT: 71 IN

## 2019-12-05 DIAGNOSIS — R12 HEARTBURN: Chronic | ICD-10-CM

## 2019-12-05 DIAGNOSIS — E78.5 HYPERLIPIDEMIA LDL GOAL <70: Primary | ICD-10-CM

## 2019-12-05 DIAGNOSIS — Z12.11 COLON CANCER SCREENING: ICD-10-CM

## 2019-12-05 DIAGNOSIS — R19.7 DIARRHEA, UNSPECIFIED TYPE: Primary | Chronic | ICD-10-CM

## 2019-12-05 DIAGNOSIS — Z80.0 FAMILY HISTORY OF COLON CANCER: Chronic | ICD-10-CM

## 2019-12-05 DIAGNOSIS — R63.4 WEIGHT LOSS: Chronic | ICD-10-CM

## 2019-12-05 DIAGNOSIS — I10 ESSENTIAL HYPERTENSION: ICD-10-CM

## 2019-12-05 DIAGNOSIS — Z72.0 TOBACCO USE: ICD-10-CM

## 2019-12-05 DIAGNOSIS — I25.10 MULTIPLE VESSEL CORONARY ARTERY DISEASE: ICD-10-CM

## 2019-12-05 PROCEDURE — 99214 OFFICE O/P EST MOD 30 MIN: CPT | Performed by: INTERNAL MEDICINE

## 2019-12-05 PROCEDURE — 99214 OFFICE O/P EST MOD 30 MIN: CPT | Performed by: NURSE PRACTITIONER

## 2019-12-05 RX ORDER — SODIUM CHLORIDE 9 MG/ML
70 INJECTION, SOLUTION INTRAVENOUS CONTINUOUS PRN
Status: CANCELLED | OUTPATIENT
Start: 2019-12-05

## 2019-12-05 RX ORDER — VARENICLINE TARTRATE 1 MG/1
1 TABLET, FILM COATED ORAL 2 TIMES DAILY
Qty: 180 TABLET | Refills: 0 | Status: SHIPPED | OUTPATIENT
Start: 2019-12-05 | End: 2020-08-26

## 2019-12-05 NOTE — PATIENT INSTRUCTIONS
1. Antireflux measures: Avoid fried, fatty foods, alcohol, chocolate, coffee, tea,  soft drinks, peppermint and spearmint, spicy foods, tomatoes and tomato based foods, onion based foods, and smoking. Other antireflux measures include weight reduction if overweight, avoiding tight clothing around the abdomen, elevating the head of the bed 6 inches with blocks under the head board, and don't drink or eat before going to bed and avoid lying down immediately after meals.  2. Omeprazole 20 mg 1 by mouth in the am 30 minutes before breakfast.  3. Upper endoscopy-EGD: Description of the procedure, risks, benefits, alternatives and options, including nonoperative options, were discussed with the patient in detail. The patient understands and wishes to proceed.  4. Colonoscopy: Description of the procedure, risks, benefits, alternatives and options, including nonoperative options, were discussed with the patient in detail. The patient understands and wishes to proceed.  5. The patient wishes to schedule in April 2019 as he is going to [place] for the winter in the next week or two. He will call the office if any medical changes prior to procedures.
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
## 2019-12-10 ENCOUNTER — TREATMENT (OUTPATIENT)
Dept: PHYSICAL THERAPY | Facility: CLINIC | Age: 59
End: 2019-12-10

## 2019-12-10 DIAGNOSIS — M54.50 ACUTE BILATERAL LOW BACK PAIN WITHOUT SCIATICA: Primary | ICD-10-CM

## 2019-12-10 PROCEDURE — 97140 MANUAL THERAPY 1/> REGIONS: CPT | Performed by: PHYSICAL THERAPIST

## 2019-12-10 PROCEDURE — 97110 THERAPEUTIC EXERCISES: CPT | Performed by: PHYSICAL THERAPIST

## 2019-12-10 NOTE — PROGRESS NOTES
Physical Therapy Daily Progress Note    Patient Information  Johnny Crow  1960      Visit # : 2    Johnny Crow reports 0/10 pain today at rest.  Pt reports he is only having occasional pain when walking.         Objective Pt presents to PT today with no distress noted.     Pt with less pain overall.  He still has some pain with End range motion.      Post Pt exercises his pain elevated to 2/10 in the lumbar spine.     See Exercise, Manual, and Modality Logs for complete treatment.     Assessment/Plan  Pt with improved function and less pain noted.  He still has some pain with elevated activity.       Progress per Plan of Care and Progress strengthening /stabilization /functional activity      Visit Diagnoses:    ICD-10-CM ICD-9-CM   1. Acute bilateral low back pain without sciatica M54.5 724.2     338.19            Manual Therapy:    9     mins  23811;  Therapeutic Exercise:    42     mins  36863;     Neuromuscular Ector:        mins  02506;    Therapeutic Activity:          mins  35613;     Gait Training:        ___  mins  04637;     Ultrasound:          mins  89829;    Electrical Stimulation:         mins  07173 ( );  Dry Needling          mins self-pay    Timed Treatment:   51   mins   Total Treatment:     51   mins    Lucas Scott, PT  Physical Therapist

## 2019-12-12 ENCOUNTER — TREATMENT (OUTPATIENT)
Dept: PHYSICAL THERAPY | Facility: CLINIC | Age: 59
End: 2019-12-12

## 2019-12-12 DIAGNOSIS — M54.50 ACUTE BILATERAL LOW BACK PAIN WITHOUT SCIATICA: Primary | ICD-10-CM

## 2019-12-12 PROCEDURE — 97140 MANUAL THERAPY 1/> REGIONS: CPT | Performed by: PHYSICAL THERAPIST

## 2019-12-12 PROCEDURE — 97110 THERAPEUTIC EXERCISES: CPT | Performed by: PHYSICAL THERAPIST

## 2019-12-12 RX ORDER — NITROGLYCERIN 0.4 MG/1
TABLET SUBLINGUAL
Qty: 25 TABLET | Refills: 11 | Status: SHIPPED | OUTPATIENT
Start: 2019-12-12 | End: 2021-05-03 | Stop reason: SDUPTHER

## 2019-12-12 RX ORDER — SIMVASTATIN 80 MG
TABLET ORAL
Qty: 30 TABLET | Refills: 0 | Status: SHIPPED | OUTPATIENT
Start: 2019-12-12 | End: 2020-01-15

## 2019-12-12 NOTE — PROGRESS NOTES
Physical Therapy Daily Progress Note    Patient Information  Johnny Crow  1960      Visit # : 3    Johnny Crow reports 0/10 pain today at rest.  Pt reports tightness is the main issue at this time.  He reports taking a MM relaxer at night (after PT) to help the mm relax as he lays down.         Objective Pt presents to PT today with no distress noted.  Pt transferring without guarding.      Pt able to perform more exercises without elevated pain.      DKTC passive is less painful.    See Exercise, Manual, and Modality Logs for complete treatment.     Assessment/Plan  Pt with improved tolerance to exercise and less pain.       Progress per Plan of Care and Progress strengthening /stabilization /functional activity      Visit Diagnoses:    ICD-10-CM ICD-9-CM   1. Acute bilateral low back pain without sciatica M54.5 724.2     338.19            Manual Therapy:    9     mins  44262;  Therapeutic Exercise:    42     mins  97339;     Neuromuscular Ector:        mins  91226;    Therapeutic Activity:          mins  35640;     Gait Training:        ___  mins  85802;     Ultrasound:          mins  72202;    Electrical Stimulation:         mins  57325 ( );  Dry Needling          mins self-pay    Timed Treatment:   51   mins   Total Treatment:     51   mins    Lucas Scott, PT  Physical Therapist

## 2019-12-19 ENCOUNTER — TREATMENT (OUTPATIENT)
Dept: PHYSICAL THERAPY | Facility: CLINIC | Age: 59
End: 2019-12-19

## 2019-12-19 DIAGNOSIS — M54.50 ACUTE BILATERAL LOW BACK PAIN WITHOUT SCIATICA: Primary | ICD-10-CM

## 2019-12-19 PROCEDURE — 97110 THERAPEUTIC EXERCISES: CPT | Performed by: PHYSICAL THERAPIST

## 2019-12-19 NOTE — PROGRESS NOTES
Physical Therapy Daily Progress Note    Patient Information  Johnny Crow  1960    D/C note     Visit # : 4    Johnny Crow reports 0/10 pain today at rest.  Pt reports Tuesday night he had 2 episodes of pain when he tried to lay on his L side with legs spread.         Objective Pt presents to PT today with no distress noted.     Pt able to transfer Independently without any distress noted.     Trunk ROM is WFL's without any pain.    HEP was performed today without any pain.     See Exercise, Manual, and Modality Logs for complete treatment.     Assessment/Plan  Pt with improved overall status. He is pain free with ROM and exercise activity.  He has pain only with certain sleeping positions but those are not a good positioning in any circumstances.   All goals are met.       D/C to HEP.     Visit Diagnoses:    ICD-10-CM ICD-9-CM   1. Acute bilateral low back pain without sciatica M54.5 724.2     338.19            Manual Therapy:         mins  22634;  Therapeutic Exercise:    43     mins  57954;     Neuromuscular Ector:        mins  89592;    Therapeutic Activity:          mins  37059;     Gait Training:        ___  mins  72325;     Ultrasound:          mins  14951;    Electrical Stimulation:         mins  36039 ( );  Dry Needling          mins self-pay    Timed Treatment:   43   mins   Total Treatment:     43   mins    Lucas Scott, PT  Physical Therapist

## 2019-12-20 LAB
ALBUMIN SERPL-MCNC: 4.2 G/DL (ref 3.5–5.2)
ALP SERPL-CCNC: 71 U/L (ref 39–117)
ALT SERPL-CCNC: 20 U/L (ref 1–41)
AST SERPL-CCNC: 31 U/L (ref 1–40)
BILIRUB DIRECT SERPL-MCNC: <0.2 MG/DL (ref 0.2–0.3)
BILIRUB SERPL-MCNC: 0.5 MG/DL (ref 0.2–1.2)
CHOLEST SERPL-MCNC: 119 MG/DL (ref 0–200)
HDLC SERPL-MCNC: 45 MG/DL (ref 40–60)
LDLC SERPL CALC-MCNC: 36 MG/DL (ref 0–100)
PROT SERPL-MCNC: 7.2 G/DL (ref 6–8.5)
TRIGL SERPL-MCNC: 190 MG/DL (ref 0–150)
VLDLC SERPL CALC-MCNC: 38 MG/DL

## 2019-12-23 RX ORDER — OMEPRAZOLE 20 MG/1
CAPSULE, DELAYED RELEASE ORAL
Qty: 90 CAPSULE | Refills: 3 | Status: SHIPPED | OUTPATIENT
Start: 2019-12-23 | End: 2020-11-02 | Stop reason: SDUPTHER

## 2019-12-27 RX ORDER — AMLODIPINE BESYLATE 5 MG/1
TABLET ORAL
Qty: 90 TABLET | Refills: 3 | Status: SHIPPED | OUTPATIENT
Start: 2019-12-27 | End: 2020-03-26

## 2019-12-30 ENCOUNTER — HOSPITAL ENCOUNTER (OUTPATIENT)
Facility: HOSPITAL | Age: 59
Setting detail: HOSPITAL OUTPATIENT SURGERY
End: 2019-12-30
Attending: INTERNAL MEDICINE | Admitting: INTERNAL MEDICINE

## 2019-12-30 DIAGNOSIS — Z01.812 PRE-PROCEDURE LAB EXAM: ICD-10-CM

## 2019-12-30 PROBLEM — Z12.11 COLON CANCER SCREENING: Status: ACTIVE | Noted: 2019-12-30

## 2020-01-15 RX ORDER — SIMVASTATIN 80 MG
TABLET ORAL
Qty: 90 TABLET | Refills: 1 | Status: SHIPPED | OUTPATIENT
Start: 2020-01-15 | End: 2020-07-09

## 2020-02-05 RX ORDER — ISOSORBIDE MONONITRATE 30 MG/1
TABLET, EXTENDED RELEASE ORAL
Qty: 90 TABLET | Refills: 3 | Status: SHIPPED | OUTPATIENT
Start: 2020-02-05 | End: 2020-02-18

## 2020-02-18 RX ORDER — ISOSORBIDE MONONITRATE 30 MG/1
TABLET, EXTENDED RELEASE ORAL
Qty: 90 TABLET | Refills: 2 | Status: SHIPPED | OUTPATIENT
Start: 2020-02-18 | End: 2020-11-02 | Stop reason: SDUPTHER

## 2020-02-19 ENCOUNTER — TELEPHONE (OUTPATIENT)
Dept: INTERNAL MEDICINE | Facility: CLINIC | Age: 60
End: 2020-02-19

## 2020-02-19 RX ORDER — ESCITALOPRAM OXALATE 10 MG/1
10 TABLET ORAL DAILY
Qty: 90 TABLET | Refills: 3 | Status: SHIPPED | OUTPATIENT
Start: 2020-02-19 | End: 2020-11-02 | Stop reason: SDUPTHER

## 2020-02-19 NOTE — TELEPHONE ENCOUNTER
Pt called and stated that refills are needed for the following prescription(s):     escitalopram (LEXAPRO) 10 MG tablet    Pharmacy: [facility] in [place]  PH: [phone]  FX: [phone]    Patient Callback: [phone]     Please advise.

## 2020-03-26 RX ORDER — AMLODIPINE BESYLATE 5 MG/1
TABLET ORAL
Qty: 90 TABLET | Refills: 2 | Status: SHIPPED | OUTPATIENT
Start: 2020-03-26 | End: 2020-11-02 | Stop reason: SDUPTHER

## 2020-04-10 PROBLEM — R63.4 WEIGHT LOSS: Chronic | Status: RESOLVED | Noted: 2019-12-05 | Resolved: 2020-04-10

## 2020-04-10 PROBLEM — R63.4 WEIGHT LOSS: Status: ACTIVE | Noted: 2019-12-30

## 2020-04-27 RX ORDER — METOPROLOL SUCCINATE 50 MG/1
50 TABLET, EXTENDED RELEASE ORAL DAILY
Qty: 90 TABLET | Refills: 3 | Status: SHIPPED | OUTPATIENT
Start: 2020-04-27 | End: 2020-11-02 | Stop reason: SDUPTHER

## 2020-07-01 ENCOUNTER — PREP FOR SURGERY (OUTPATIENT)
Dept: OTHER | Facility: HOSPITAL | Age: 60
End: 2020-07-01

## 2020-07-01 DIAGNOSIS — Z01.812 PRE-PROCEDURE LAB EXAM: Primary | ICD-10-CM

## 2020-07-01 DIAGNOSIS — R19.7 DIARRHEA, UNSPECIFIED TYPE: Primary | ICD-10-CM

## 2020-07-01 DIAGNOSIS — Z12.11 COLON CANCER SCREENING: ICD-10-CM

## 2020-07-01 DIAGNOSIS — R63.4 WEIGHT LOSS: ICD-10-CM

## 2020-07-01 DIAGNOSIS — Z80.0 FAMILY HISTORY OF COLON CANCER: ICD-10-CM

## 2020-07-01 RX ORDER — SODIUM CHLORIDE 9 MG/ML
70 INJECTION, SOLUTION INTRAVENOUS CONTINUOUS PRN
Status: CANCELLED | OUTPATIENT
Start: 2020-07-01

## 2020-07-09 RX ORDER — SIMVASTATIN 80 MG
TABLET ORAL
Qty: 90 TABLET | Refills: 0 | Status: SHIPPED | OUTPATIENT
Start: 2020-07-09 | End: 2020-10-20

## 2020-07-22 ENCOUNTER — PREP FOR SURGERY (OUTPATIENT)
Dept: OTHER | Facility: HOSPITAL | Age: 60
End: 2020-07-22

## 2020-07-22 DIAGNOSIS — R19.7 DIARRHEA, UNSPECIFIED TYPE: ICD-10-CM

## 2020-07-22 DIAGNOSIS — Z12.11 COLON CANCER SCREENING: Primary | ICD-10-CM

## 2020-07-22 DIAGNOSIS — R63.4 WEIGHT LOSS: ICD-10-CM

## 2020-07-22 DIAGNOSIS — Z80.0 FAMILY HISTORY OF COLON CANCER: ICD-10-CM

## 2020-07-22 RX ORDER — SODIUM CHLORIDE 9 MG/ML
70 INJECTION, SOLUTION INTRAVENOUS CONTINUOUS PRN
Status: CANCELLED | OUTPATIENT
Start: 2020-07-22

## 2020-07-28 ENCOUNTER — LAB (OUTPATIENT)
Dept: LAB | Facility: HOSPITAL | Age: 60
End: 2020-07-28

## 2020-07-28 PROCEDURE — C9803 HOPD COVID-19 SPEC COLLECT: HCPCS | Performed by: NURSE PRACTITIONER

## 2020-07-28 PROCEDURE — U0004 COV-19 TEST NON-CDC HGH THRU: HCPCS | Performed by: NURSE PRACTITIONER

## 2020-07-28 PROCEDURE — U0002 COVID-19 LAB TEST NON-CDC: HCPCS | Performed by: NURSE PRACTITIONER

## 2020-07-29 LAB
REF LAB TEST METHOD: NORMAL
SARS-COV-2 RNA RESP QL NAA+PROBE: NOT DETECTED

## 2020-07-31 ENCOUNTER — HOSPITAL ENCOUNTER (OUTPATIENT)
Facility: HOSPITAL | Age: 60
Setting detail: HOSPITAL OUTPATIENT SURGERY
Discharge: HOME | End: 2020-07-31
Attending: INTERNAL MEDICINE | Admitting: INTERNAL MEDICINE

## 2020-07-31 ENCOUNTER — ANESTHESIA (OUTPATIENT)
Dept: GASTROENTEROLOGY | Facility: HOSPITAL | Age: 60
End: 2020-07-31

## 2020-07-31 ENCOUNTER — ANESTHESIA EVENT (OUTPATIENT)
Dept: GASTROENTEROLOGY | Facility: HOSPITAL | Age: 60
End: 2020-07-31

## 2020-07-31 VITALS
OXYGEN SATURATION: 98 % | DIASTOLIC BLOOD PRESSURE: 82 MMHG | RESPIRATION RATE: 18 BRPM | HEIGHT: 71 IN | BODY MASS INDEX: 24.08 KG/M2 | SYSTOLIC BLOOD PRESSURE: 124 MMHG | WEIGHT: 172 LBS | HEART RATE: 54 BPM | TEMPERATURE: 97.4 F

## 2020-07-31 DIAGNOSIS — Z80.0 FAMILY HISTORY OF COLON CANCER: ICD-10-CM

## 2020-07-31 DIAGNOSIS — Z12.11 COLON CANCER SCREENING: ICD-10-CM

## 2020-07-31 PROCEDURE — 45380 COLONOSCOPY AND BIOPSY: CPT | Performed by: INTERNAL MEDICINE

## 2020-07-31 PROCEDURE — 88305 TISSUE EXAM BY PATHOLOGIST: CPT | Performed by: INTERNAL MEDICINE

## 2020-07-31 PROCEDURE — 25010000002 PROPOFOL 200 MG/20ML EMULSION: Performed by: NURSE ANESTHETIST, CERTIFIED REGISTERED

## 2020-07-31 RX ORDER — SODIUM CHLORIDE 0.9 % (FLUSH) 0.9 %
10 SYRINGE (ML) INJECTION AS NEEDED
Status: DISCONTINUED | OUTPATIENT
Start: 2020-07-31 | End: 2020-07-31 | Stop reason: HOSPADM

## 2020-07-31 RX ORDER — SODIUM CHLORIDE 9 MG/ML
70 INJECTION, SOLUTION INTRAVENOUS CONTINUOUS PRN
Status: DISCONTINUED | OUTPATIENT
Start: 2020-07-31 | End: 2020-07-31 | Stop reason: HOSPADM

## 2020-07-31 RX ORDER — LIDOCAINE HYDROCHLORIDE 20 MG/ML
INJECTION, SOLUTION INTRAVENOUS AS NEEDED
Status: DISCONTINUED | OUTPATIENT
Start: 2020-07-31 | End: 2020-07-31 | Stop reason: SURG

## 2020-07-31 RX ORDER — PROPOFOL 10 MG/ML
INJECTION, EMULSION INTRAVENOUS AS NEEDED
Status: DISCONTINUED | OUTPATIENT
Start: 2020-07-31 | End: 2020-07-31 | Stop reason: SURG

## 2020-07-31 RX ADMIN — LIDOCAINE HYDROCHLORIDE 60 MG: 20 INJECTION, SOLUTION INTRAVENOUS at 14:57

## 2020-07-31 RX ADMIN — SODIUM CHLORIDE: 9 INJECTION, SOLUTION INTRAVENOUS at 14:56

## 2020-07-31 RX ADMIN — PROPOFOL 100 MG: 10 INJECTION, EMULSION INTRAVENOUS at 14:57

## 2020-08-05 LAB
LAB AP CASE REPORT: NORMAL
PATH REPORT.FINAL DX SPEC: NORMAL

## 2020-08-26 ENCOUNTER — OFFICE VISIT (OUTPATIENT)
Dept: GASTROENTEROLOGY | Facility: CLINIC | Age: 60
End: 2020-08-26

## 2020-08-26 VITALS
RESPIRATION RATE: 16 BRPM | SYSTOLIC BLOOD PRESSURE: 141 MMHG | WEIGHT: 179 LBS | BODY MASS INDEX: 25.06 KG/M2 | TEMPERATURE: 97.7 F | HEIGHT: 71 IN | HEART RATE: 67 BPM | DIASTOLIC BLOOD PRESSURE: 83 MMHG

## 2020-08-26 DIAGNOSIS — R63.4 WEIGHT LOSS, ABNORMAL: ICD-10-CM

## 2020-08-26 DIAGNOSIS — K59.1 FUNCTIONAL DIARRHEA: Chronic | ICD-10-CM

## 2020-08-26 DIAGNOSIS — K63.5 POLYP OF COLON, UNSPECIFIED PART OF COLON, UNSPECIFIED TYPE: ICD-10-CM

## 2020-08-26 DIAGNOSIS — R12 HEARTBURN: Primary | Chronic | ICD-10-CM

## 2020-08-26 PROBLEM — Z12.11 COLON CANCER SCREENING: Status: RESOLVED | Noted: 2019-12-30 | Resolved: 2020-08-26

## 2020-08-26 PROCEDURE — 99214 OFFICE O/P EST MOD 30 MIN: CPT | Performed by: NURSE PRACTITIONER

## 2020-08-26 RX ORDER — SODIUM CHLORIDE 9 MG/ML
70 INJECTION, SOLUTION INTRAVENOUS CONTINUOUS PRN
Status: CANCELLED | OUTPATIENT
Start: 2020-08-26

## 2020-08-26 NOTE — PROGRESS NOTES
Follow Up Note     Date: 2020   Patient Name: Johnny Crow  MRN: [id number]  : 1960      Primary Care Provider: Sagar Cid MD     Chief Complaint:    Chief Complaint   Patient presents with   • Follow-up     History of present illness:   2020  Johnny Crow is a 60 y.o. male who is here today for follow up after colonoscopy. His weight has been the same as his visit in 2019. He has not gained any weight back. Appetite is the same. Patient had colonoscopy 2020 with 2 polyps removed, otherwise normal.  Biopsies were negative.    Interval History:  2019    The patient has lost about 12 pounds over the past year. This is described as unintentional. He has had decreased appetite. He does not eat very much at one time. There is no history of nausea or vomiting.      There is a long standing history of diarrhea. The patient has 2-6 bowel movements per day. Stools are described as loose. If he drinks alcohol, diarrhea is worse. The patient is not taking anything for diarrhea. There is no history of bright red blood per rectum or melena. The patient denies abdominal pain.      There is a long standing history of heartburn. The patient takes Omeprazole with reasonable control of heartburn. Heartburn is mild. Reflux is not worse at night. There is no history of difficulty swallowing.    Subjective      Past Medical History:   Diagnosis Date   • Anxiety    • Arthritis    • Atypical chest pain 3/9/2017   • CAD (coronary artery disease)    • CAD, multiple vessel      History of cardiac stenting to the RCA 2006 with subsequent cardiac catheterizations in  and  reporting minimal disease with patent stent.2018 LAST STENT PLACED   • Collapse of right lung      HAD A CHEST TUBE AT THIS TIME    • Concussion 2019   • COPD (chronic obstructive pulmonary disease) (CMS/[facility])       Moderate obstruction with previous improvement on Spiriva;   Long-standing history of tobacco abuse; Bullous emphysema noted on CT scan of the chest with history of prior spontaneous pneumothorax.   • Dyslipidemia    • Emphysema of lung (CMS/HCC)    • Fatigue    • GERD (gastroesophageal reflux disease)    • Heart attack (CMS/HCC) 2014    STENT PLACED AT THIS TIME   • Hiatal hernia    • History of kidney stones     PASSED WITHOUT SURGERY 40 YEARS AGO   • Hydropneumothorax      CT scan of the chest 06/17/2014, reports A. probable chronic anterior left chest hydropneumothorax.   • Hyperlipidemia 2006   • Hypertension 2000   • Hypertriglyceridemia 2006   • Leg pain     Normal KIMMY, 09/13/2006, at [facility].     • Myocardial infarction (CMS/HCC) 2006   • Panic attack 2010   • Pulmonary nodules      CT scan of the chest 06/17/2014 reports scattered 2-3 mm noncalcified nodules in the right middle lobe and left lower lobe, previous CT scan of the chest 09/16/2011 reported multiple 2 mm right middle lobe nodules.   • Renal calculi    • RLS (restless legs syndrome) 2010   • Skin cancer    • Spontaneous pneumothorax     Secondary to bullous emphysema, x2 in 1997.     Past Surgical History:   Procedure Laterality Date   • CARDIAC CATHETERIZATION      Catheterization 12/21/2012 reports noncritical coronary artery disease and a widely patent right coronary artery stent.   • CARDIAC CATHETERIZATION N/A 5/26/2017    Procedure: Left Heart Cath;  Surgeon: Anai Sweet MD;  Location: [facility] CATH INVASIVE LOCATION;  Service:    • CARDIAC CATHETERIZATION  02/2018    Male clinic in [facility]   • CHEST TUBE INSERTION  1996, 1997   • COLONOSCOPY  2016   • COLONOSCOPY N/A 7/31/2020    Procedure: COLONOSCOPY WITH BIOPSIES AND POLYPECTOMIES;  Surgeon: Sena Noel MD;  Location: [facility] ENDOSCOPY;  Service: Gastroenterology;  Laterality: N/A;   • CORONARY STENT PLACEMENT  2006   • ENDOSCOPY     • INGUINAL HERNIA REPAIR Right 2009   • POLYPECTOMY  2006     Status post polypectomy x2, noncancerous, spring 2006.   • UPPER GASTROINTESTINAL ENDOSCOPY  over 10 years ago     Family History   Problem Relation Age of Onset   • Heart disease Mother    • Diabetes Father    • Heart disease Father    • Cancer Sister         mass in lower intestine   • Colon cancer Sister 58   • Heart disease Brother    • Liver disease Brother    • Cancer Brother         colon   • Cirrhosis Brother    • Colon cancer Brother    • Crohn's disease Cousin    • Liver cancer Neg Hx    • Ulcerative colitis Neg Hx    • Esophageal cancer Neg Hx    • Stomach cancer Neg Hx      Social History     Socioeconomic History   • Marital status:      Spouse name: Not on file   • Number of children: Not on file   • Years of education: Not on file   • Highest education level: Not on file   Tobacco Use   • Smoking status: Current Every Day Smoker     Packs/day: 1.50     Types: Cigarettes     Start date: 1976   • Smokeless tobacco: Former User     Types: Snuff     Quit date: 2017   Substance and Sexual Activity   • Alcohol use: Yes     Alcohol/week: 24.0 standard drinks     Types: 24 Cans of beer per week     Comment: weekly   • Drug use: Yes     Types: Marijuana     Comment: EVERY NOW AND THEN   • Sexual activity: Defer       Current Outpatient Medications:   •  albuterol sulfate  (90 Base) MCG/ACT inhaler, Inhale 2 puffs Every 4 (Four) Hours As Needed for Wheezing., Disp: 1 inhaler, Rfl: 11  •  amLODIPine (NORVASC) 5 MG tablet, TAKE ONE TABLET BY MOUTH ONCE DAILY, Disp: 90 tablet, Rfl: 2  •  aspirin 325 MG tablet, Take 1 tablet by mouth daily., Disp: , Rfl:   •  escitalopram (LEXAPRO) 10 MG tablet, Take 1 tablet by mouth Daily., Disp: 90 tablet, Rfl: 3  •  isosorbide mononitrate (IMDUR) 30 MG 24 hr tablet, TAKE ONE TABLET BY MOUTH ONCE DAILY, Disp: 90 tablet, Rfl: 2  •  metoprolol succinate XL (TOPROL-XL) 50 MG 24 hr tablet, Take 1 tablet by mouth Daily., Disp: 90 tablet, Rfl: 3  •  nitroglycerin  (NITROSTAT) 0.4 MG SL tablet, take 1 tablet under the tongue EVERY 5 MINUTES if needed for chest pain, Disp: 25 tablet, Rfl: 11  •  omeprazole (priLOSEC) 20 MG capsule, take 1 capsule by mouth daily, Disp: 90 capsule, Rfl: 3  •  simvastatin (ZOCOR) 80 MG tablet, TAKE 1 TABLET BY MOUTH EVERY DAY IN THE EVENING, Disp: 90 tablet, Rfl: 0  •  temazepam (RESTORIL) 15 MG capsule, Take 1 capsule by mouth At Night As Needed for Sleep., Disp: 30 capsule, Rfl: 0  •  TiZANidine (ZANAFLEX) 4 MG capsule, Take 1 capsule by mouth 3 (Three) Times a Day As Needed for Muscle Spasms., Disp: 15 capsule, Rfl: 0     No Known Allergies     Review of Systems   Constitutional: Positive for appetite change and unexpected weight loss.   HENT: Negative for trouble swallowing.    Eyes: Negative for blurred vision.   Respiratory: Negative for choking and chest tightness.    Cardiovascular: Negative for leg swelling.   Gastrointestinal: Positive for diarrhea and GERD. Negative for abdominal distention, abdominal pain, anal bleeding, blood in stool, constipation, nausea, rectal pain, vomiting and indigestion.   Endocrine: Negative for polyphagia.   Genitourinary: Negative for hematuria.   Musculoskeletal: Negative for arthralgias and myalgias.   Skin: Negative for rash.   Allergic/Immunologic: Negative for food allergies.   Neurological: Negative for dizziness, syncope and confusion.   Hematological: Does not bruise/bleed easily.   Psychiatric/Behavioral: Negative for depressed mood.      The following portions of the patient's history were reviewed and updated as appropriate: allergies, current medications, past family history, past medical history, past social history, past surgical history and problem list.    Objective     Physical Exam   Constitutional: He is oriented to person, place, and time. He appears well-developed and well-nourished. No distress.   HENT:   Head: Normocephalic and atraumatic.   Right Ear: Hearing and external ear normal.  "  Left Ear: Hearing and external ear normal.   Nose: Nose normal.   Mouth/Throat: Oropharynx is clear and moist and mucous membranes are normal. Mucous membranes are not pale, not dry and not cyanotic. No oral lesions. No oropharyngeal exudate.   Eyes: Conjunctivae and EOM are normal. Right eye exhibits no discharge. Left eye exhibits no discharge.   Neck: Trachea normal. Neck supple. No JVD present. No edema present. No thyroid mass and no thyromegaly present.   Cardiovascular: Normal rate, regular rhythm, S2 normal and normal heart sounds. Exam reveals no gallop, no S3 and no friction rub.   No murmur heard.  Pulmonary/Chest: Effort normal and breath sounds normal. No respiratory distress. He exhibits no tenderness.   Abdominal: Normal appearance and bowel sounds are normal. He exhibits no distension, no ascites and no mass. There is no splenomegaly or hepatomegaly. There is no tenderness. There is no rigidity, no rebound and no guarding. No hernia.     Vascular Status -  His right foot exhibits no edema. His left foot exhibits no edema.  Lymphadenopathy:     He has no cervical adenopathy.        Left: No supraclavicular adenopathy present.   Neurological: He is alert and oriented to person, place, and time. He has normal strength. No cranial nerve deficit or sensory deficit.   Skin: No rash noted. He is not diaphoretic. No cyanosis. No pallor. Nails show no clubbing.   Psychiatric: He has a normal mood and affect.   Nursing note and vitals reviewed.    Vitals:    08/26/20 1036   BP: 141/83   Pulse: 67   Resp: 16   Temp: 97.7 °F (36.5 °C)   Weight: 81.2 kg (179 lb)   Height: 180.3 cm (71\")     Results Review:   I reviewed the patient's new clinical results.    Admission on 07/31/2020, Discharged on 07/31/2020   Component Date Value Ref Range Status   • Case Report 07/31/2020    Final                    Value:Surgical Pathology Report                         Case: [id number]                                  "   Authorizing Provider:  Sena Noel MD  Collected:           07/31/2020 03:00 PM          Ordering Location:     [facility]    Received:            08/03/2020 09:14 AM                                 [facility] END[facility]                                                                    Pathologist:           Tamara Marie DO                                                        Specimens:   1) - Small Intestine, Ileum, terminal ileum biopsy                                                  2) - Large Intestine, random biopsies to rule out microscopic colitis                               3) - Large Intestine, Rectum, RECTAL POLYPS                                               • Final Diagnosis 07/31/2020    Final                    Value:This result contains rich text formatting which cannot be displayed here.      No radiology results for the last 90 days.     Assessment / Plan      1. Heartburn  The patient has a history of heartburn > 10 years. Reasonable control with Omeprazole. Concerns for underlying Seymour's.  EGD to rule out Seymour's.     2. Weight loss, abnormal  The patient lost about 12 pounds over the past year. This is described as unintentional. He has not gained any weight back, but weight is stable. He has a history of decreased appetite.   EGD for further evaluation.    - Case Request; Standing  - Implement Anesthesia Orders Day of Procedure; Standing  - Obtain Informed Consent; Standing  - Oxygen Therapy- Nasal Cannula; 2 LPM; Titrate for SPO2: equal to or greater than, 90%; Standing  - sodium chloride 0.9 % infusion  - Case Request    3. Functional diarrhea  No endoscopic evidence of microscopic colitis or inflammatory bowel disease.  EGD to rule out celiac.    4. Polyp of colon, unspecified part of colon, unspecified type  2 colon polyps removed, benign.    Patient Instructions   1. Antireflux measures: Avoid fried, fatty foods, alcohol, chocolate, coffee, tea,  soft drinks,  peppermint and spearmint, spicy foods, tomatoes and tomato based foods, onion based foods, and smoking. Other antireflux measures include weight reduction if overweight, avoiding tight clothing around the abdomen, elevating the head of the bed 6 inches with blocks under the head board, and don't drink or eat before going to bed and avoid lying down immediately after meals.  2. Omeprazole 20 mg 1 by mouth in the am 30 minutes before breakfast.  3. High fiber diet with liberal water intake.   4. The patient may take Imodium 1/2-1 caplet 1-2 times per day as needed for diarrhea.   5. Follow up colonoscopy in 5 years due to family history of colon cancer in his brother.  6. Upper endoscopy-EGD: Description of the procedure, risks, benefits, alternatives and options, including nonoperative options, were discussed with the patient in detail. The patient understands and wishes to proceed.  7. COVID-19 testing prior to procedure. The patient will need to self-quarantine after testing until the procedure. Instructions given to patient.    Chucho Lopez, APRN  8/26/2020    Please note that portions of this note may have been completed with a voice recognition program. Efforts were made to edit the dictations, but occasionally words are mistranscribed.

## 2020-08-26 NOTE — PATIENT INSTRUCTIONS
1. Antireflux measures: Avoid fried, fatty foods, alcohol, chocolate, coffee, tea,  soft drinks, peppermint and spearmint, spicy foods, tomatoes and tomato based foods, onion based foods, and smoking. Other antireflux measures include weight reduction if overweight, avoiding tight clothing around the abdomen, elevating the head of the bed 6 inches with blocks under the head board, and don't drink or eat before going to bed and avoid lying down immediately after meals.  2. Omeprazole 20 mg 1 by mouth in the am 30 minutes before breakfast.  3. High fiber diet with liberal water intake.   4. The patient may take Imodium 1/2-1 caplet 1-2 times per day as needed for diarrhea.   5. Follow up colonoscopy in 5 years due to family history of colon cancer in his brother.  6. Upper endoscopy-EGD: Description of the procedure, risks, benefits, alternatives and options, including nonoperative options, were discussed with the patient in detail. The patient understands and wishes to proceed.  7. COVID-19 testing prior to procedure. The patient will need to self-quarantine after testing until the procedure. Instructions given to patient.

## 2020-09-20 ENCOUNTER — APPOINTMENT (OUTPATIENT)
Dept: GENERAL RADIOLOGY | Facility: HOSPITAL | Age: 60
End: 2020-09-20

## 2020-09-20 ENCOUNTER — HOSPITAL ENCOUNTER (EMERGENCY)
Facility: HOSPITAL | Age: 60
Discharge: HOME | End: 2020-09-20
Attending: EMERGENCY MEDICINE | Admitting: EMERGENCY MEDICINE

## 2020-09-20 VITALS
BODY MASS INDEX: 25.15 KG/M2 | WEIGHT: 179.6 LBS | RESPIRATION RATE: 18 BRPM | OXYGEN SATURATION: 94 % | DIASTOLIC BLOOD PRESSURE: 74 MMHG | SYSTOLIC BLOOD PRESSURE: 132 MMHG | TEMPERATURE: 98.4 F | HEIGHT: 71 IN | HEART RATE: 66 BPM

## 2020-09-20 DIAGNOSIS — R07.9 CHEST PAIN, UNSPECIFIED TYPE: Primary | ICD-10-CM

## 2020-09-20 LAB
ALBUMIN SERPL-MCNC: 4.2 G/DL (ref 3.5–5.2)
ALBUMIN/GLOB SERPL: 1.2 G/DL
ALP SERPL-CCNC: 75 U/L (ref 39–117)
ALT SERPL W P-5'-P-CCNC: 29 U/L (ref 1–41)
ANION GAP SERPL CALCULATED.3IONS-SCNC: 11.5 MMOL/L (ref 5–15)
AST SERPL-CCNC: 44 U/L (ref 1–40)
BASOPHILS # BLD AUTO: 0.07 10*3/MM3 (ref 0–0.2)
BASOPHILS NFR BLD AUTO: 0.8 % (ref 0–1.5)
BILIRUB SERPL-MCNC: 0.7 MG/DL (ref 0–1.2)
BUN SERPL-MCNC: 8 MG/DL (ref 8–23)
BUN/CREAT SERPL: 9.1 (ref 7–25)
CALCIUM SPEC-SCNC: 9.2 MG/DL (ref 8.6–10.5)
CHLORIDE SERPL-SCNC: 103 MMOL/L (ref 98–107)
CO2 SERPL-SCNC: 24.5 MMOL/L (ref 22–29)
CREAT SERPL-MCNC: 0.88 MG/DL (ref 0.76–1.27)
DEPRECATED RDW RBC AUTO: 40.6 FL (ref 37–54)
EOSINOPHIL # BLD AUTO: 0.18 10*3/MM3 (ref 0–0.4)
EOSINOPHIL NFR BLD AUTO: 2.1 % (ref 0.3–6.2)
ERYTHROCYTE [DISTWIDTH] IN BLOOD BY AUTOMATED COUNT: 11.6 % (ref 12.3–15.4)
GFR SERPL CREATININE-BSD FRML MDRD: 88 ML/MIN/1.73
GLOBULIN UR ELPH-MCNC: 3.6 GM/DL
GLUCOSE SERPL-MCNC: 117 MG/DL (ref 65–99)
HCT VFR BLD AUTO: 44.8 % (ref 37.5–51)
HGB BLD-MCNC: 16 G/DL (ref 13–17.7)
HOLD SPECIMEN: NORMAL
HOLD SPECIMEN: NORMAL
IMM GRANULOCYTES # BLD AUTO: 0.03 10*3/MM3 (ref 0–0.05)
IMM GRANULOCYTES NFR BLD AUTO: 0.3 % (ref 0–0.5)
LIPASE SERPL-CCNC: 75 U/L (ref 13–60)
LYMPHOCYTES # BLD AUTO: 3.29 10*3/MM3 (ref 0.7–3.1)
LYMPHOCYTES NFR BLD AUTO: 38.2 % (ref 19.6–45.3)
MCH RBC QN AUTO: 34 PG (ref 26.6–33)
MCHC RBC AUTO-ENTMCNC: 35.7 G/DL (ref 31.5–35.7)
MCV RBC AUTO: 95.3 FL (ref 79–97)
MONOCYTES # BLD AUTO: 0.6 10*3/MM3 (ref 0.1–0.9)
MONOCYTES NFR BLD AUTO: 7 % (ref 5–12)
NEUTROPHILS NFR BLD AUTO: 4.44 10*3/MM3 (ref 1.7–7)
NEUTROPHILS NFR BLD AUTO: 51.6 % (ref 42.7–76)
NRBC BLD AUTO-RTO: 0 /100 WBC (ref 0–0.2)
PLATELET # BLD AUTO: 284 10*3/MM3 (ref 140–450)
PMV BLD AUTO: 9.3 FL (ref 6–12)
POTASSIUM SERPL-SCNC: 4 MMOL/L (ref 3.5–5.2)
PROT SERPL-MCNC: 7.8 G/DL (ref 6–8.5)
RBC # BLD AUTO: 4.7 10*6/MM3 (ref 4.14–5.8)
SODIUM SERPL-SCNC: 139 MMOL/L (ref 136–145)
TROPONIN I SERPL-MCNC: 0 NG/ML (ref 0–0.05)
TROPONIN T SERPL-MCNC: <0.01 NG/ML (ref 0–0.03)
TROPONIN T SERPL-MCNC: <0.01 NG/ML (ref 0–0.03)
WBC # BLD AUTO: 8.61 10*3/MM3 (ref 3.4–10.8)
WHOLE BLOOD HOLD SPECIMEN: NORMAL
WHOLE BLOOD HOLD SPECIMEN: NORMAL

## 2020-09-20 PROCEDURE — 83690 ASSAY OF LIPASE: CPT | Performed by: EMERGENCY MEDICINE

## 2020-09-20 PROCEDURE — 84484 ASSAY OF TROPONIN QUANT: CPT | Performed by: EMERGENCY MEDICINE

## 2020-09-20 PROCEDURE — 71045 X-RAY EXAM CHEST 1 VIEW: CPT

## 2020-09-20 PROCEDURE — 85025 COMPLETE CBC W/AUTO DIFF WBC: CPT | Performed by: EMERGENCY MEDICINE

## 2020-09-20 PROCEDURE — 80053 COMPREHEN METABOLIC PANEL: CPT | Performed by: EMERGENCY MEDICINE

## 2020-09-20 PROCEDURE — 99284 EMERGENCY DEPT VISIT MOD MDM: CPT

## 2020-09-20 PROCEDURE — 25010000002 MORPHINE PER 10 MG: Performed by: EMERGENCY MEDICINE

## 2020-09-20 PROCEDURE — 93005 ELECTROCARDIOGRAM TRACING: CPT | Performed by: EMERGENCY MEDICINE

## 2020-09-20 PROCEDURE — 96374 THER/PROPH/DIAG INJ IV PUSH: CPT

## 2020-09-20 RX ORDER — MORPHINE SULFATE 4 MG/ML
4 INJECTION, SOLUTION INTRAMUSCULAR; INTRAVENOUS ONCE
Status: COMPLETED | OUTPATIENT
Start: 2020-09-20 | End: 2020-09-20

## 2020-09-20 RX ORDER — LIDOCAINE HYDROCHLORIDE 20 MG/ML
15 SOLUTION OROPHARYNGEAL ONCE
Status: COMPLETED | OUTPATIENT
Start: 2020-09-20 | End: 2020-09-20

## 2020-09-20 RX ORDER — HYDROCODONE BITARTRATE AND ACETAMINOPHEN 5; 325 MG/1; MG/1
1 TABLET ORAL EVERY 6 HOURS PRN
Qty: 10 TABLET | Refills: 0 | Status: SHIPPED | OUTPATIENT
Start: 2020-09-20 | End: 2020-11-02

## 2020-09-20 RX ORDER — ALUMINA, MAGNESIA, AND SIMETHICONE 2400; 2400; 240 MG/30ML; MG/30ML; MG/30ML
15 SUSPENSION ORAL ONCE
Status: COMPLETED | OUTPATIENT
Start: 2020-09-20 | End: 2020-09-20

## 2020-09-20 RX ADMIN — ALUMINUM HYDROXIDE, MAGNESIUM HYDROXIDE, AND DIMETHICONE 15 ML: 400; 400; 40 SUSPENSION ORAL at 19:39

## 2020-09-20 RX ADMIN — LIDOCAINE HYDROCHLORIDE 15 ML: 20 SOLUTION ORAL; TOPICAL at 19:39

## 2020-09-20 RX ADMIN — MORPHINE SULFATE 4 MG: 4 INJECTION, SOLUTION INTRAMUSCULAR; INTRAVENOUS at 19:42

## 2020-09-20 NOTE — ED PROVIDER NOTES
Subjective   60-year-old male present with chest pain.  He states that about 8 hours prior to arrival started having some mild substernal chest discomfort, he describes as a heaviness.  This persisted up today, got worse about 1 hour prior to arrival. He took a nitro and that did not help so he came in for evaluation.  There are no other alleviating or aggravating factors.  He denies any fevers, chills, nausea, vomiting, shortness of breath.  He does note being at a wedding yesterday, has had a lot of stress over the weekend and has drank significant amounts of alcohol.  He does have a history of coronary disease, last intervention was a couple years ago where he had a stent.          Review of Systems   Constitutional: Negative.    HENT: Negative.    Eyes: Negative.    Respiratory: Negative.    Cardiovascular: Positive for chest pain.   Gastrointestinal: Negative.    Genitourinary: Negative.    Musculoskeletal: Negative.    Skin: Negative.    Neurological: Negative.    Psychiatric/Behavioral: Negative.        Past Medical History:   Diagnosis Date   • Anxiety    • Arthritis    • Atypical chest pain 3/9/2017   • CAD (coronary artery disease) 2006   • CAD, multiple vessel      History of cardiac stenting to the RCA September 2006 with subsequent cardiac catheterizations in 2010 and 2012 reporting minimal disease with patent stent.2018 LAST STENT PLACED   • Collapse of right lung     1996/1997 HAD A CHEST TUBE AT THIS TIME    • Concussion 2019   • COPD (chronic obstructive pulmonary disease) (CMS/[facility]) 2010      Moderate obstruction with previous improvement on Spiriva;  Long-standing history of tobacco abuse; Bullous emphysema noted on CT scan of the chest with history of prior spontaneous pneumothorax.   • Dyslipidemia    • Emphysema of lung (CMS/[facility])    • Fatigue    • GERD (gastroesophageal reflux disease)    • Heart attack (CMS/[facility]) 2014    STENT PLACED AT THIS TIME   • Hiatal hernia    • History of kidney stones      PASSED WITHOUT SURGERY 40 YEARS AGO   • Hydropneumothorax      CT scan of the chest 06/17/2014, reports A. probable chronic anterior left chest hydropneumothorax.   • Hyperlipidemia 2006   • Hypertension 2000   • Hypertriglyceridemia 2006   • Leg pain     Normal KIMMY, 09/13/2006, at [facility].     • Myocardial infarction (CMS/HCC) 2006   • Panic attack 2010   • Pulmonary nodules      CT scan of the chest 06/17/2014 reports scattered 2-3 mm noncalcified nodules in the right middle lobe and left lower lobe, previous CT scan of the chest 09/16/2011 reported multiple 2 mm right middle lobe nodules.   • Renal calculi    • RLS (restless legs syndrome) 2010   • Skin cancer    • Spontaneous pneumothorax     Secondary to bullous emphysema, x2 in 1997.       No Known Allergies    Past Surgical History:   Procedure Laterality Date   • CARDIAC CATHETERIZATION      Catheterization 12/21/2012 reports noncritical coronary artery disease and a widely patent right coronary artery stent.   • CARDIAC CATHETERIZATION N/A 5/26/2017    Procedure: Left Heart Cath;  Surgeon: Anai Sweet MD;  Location: [facility] CATH INVASIVE LOCATION;  Service:    • CARDIAC CATHETERIZATION  02/2018    Male clinic in [facility]   • CHEST TUBE INSERTION  1996, 1997   • COLONOSCOPY  2016   • COLONOSCOPY N/A 7/31/2020    Procedure: COLONOSCOPY WITH BIOPSIES AND POLYPECTOMIES;  Surgeon: Sena Noel MD;  Location: [facility] ENDOSCOPY;  Service: Gastroenterology;  Laterality: N/A;   • CORONARY STENT PLACEMENT  2006   • ENDOSCOPY     • INGUINAL HERNIA REPAIR Right 2009   • POLYPECTOMY  2006    Status post polypectomy x2, noncancerous, spring 2006.   • UPPER GASTROINTESTINAL ENDOSCOPY  over 10 years ago       Family History   Problem Relation Age of Onset   • Heart disease Mother    • Diabetes Father    • Heart disease Father    • Cancer Sister         mass in lower intestine   • Colon cancer Sister 58   • Heart disease  Brother    • Liver disease Brother    • Cancer Brother         colon   • Cirrhosis Brother    • Colon cancer Brother    • Crohn's disease Cousin    • Liver cancer Neg Hx    • Ulcerative colitis Neg Hx    • Esophageal cancer Neg Hx    • Stomach cancer Neg Hx        Social History     Socioeconomic History   • Marital status:      Spouse name: Not on file   • Number of children: Not on file   • Years of education: Not on file   • Highest education level: Not on file   Tobacco Use   • Smoking status: Current Every Day Smoker     Packs/day: 1.50     Types: Cigarettes     Start date: 1976   • Smokeless tobacco: Former User     Types: Snuff     Quit date: 2017   Substance and Sexual Activity   • Alcohol use: Yes     Alcohol/week: 24.0 standard drinks     Types: 24 Cans of beer per week     Comment: weekly   • Drug use: Yes     Types: Marijuana     Comment: EVERY NOW AND THEN   • Sexual activity: Defer           Objective   Physical Exam  Vitals signs reviewed.   Constitutional:       General: He is not in acute distress.     Appearance: Normal appearance. He is not ill-appearing, toxic-appearing or diaphoretic.   HENT:      Head: Normocephalic and atraumatic.      Right Ear: External ear normal.      Left Ear: External ear normal.      Nose: Nose normal.      Mouth/Throat:      Mouth: Mucous membranes are moist.      Pharynx: Oropharynx is clear.   Eyes:      Extraocular Movements: Extraocular movements intact.      Conjunctiva/sclera: Conjunctivae normal.      Pupils: Pupils are equal, round, and reactive to light.   Neck:      Musculoskeletal: Normal range of motion and neck supple.   Cardiovascular:      Rate and Rhythm: Normal rate and regular rhythm.      Pulses: Normal pulses.      Heart sounds: Normal heart sounds. No murmur.   Pulmonary:      Effort: Pulmonary effort is normal. No respiratory distress.      Breath sounds: Normal breath sounds.   Abdominal:      General: Bowel sounds are normal. There is no  distension.      Tenderness: There is no abdominal tenderness.   Musculoskeletal: Normal range of motion.         General: No swelling, tenderness or deformity.   Skin:     General: Skin is warm and dry.      Capillary Refill: Capillary refill takes less than 2 seconds.      Findings: No rash.   Neurological:      General: No focal deficit present.      Mental Status: He is alert and oriented to person, place, and time.   Psychiatric:         Mood and Affect: Mood normal.         Behavior: Behavior normal.         Procedures           ED Course                                           MDM  Number of Diagnoses or Management Options  Chest pain, unspecified type:   Diagnosis management comments: 60-year-old male with chest pain.  Well-developed, well-nourished man in no distress with exam as above.  He has normal vital signs.  His exam is nonfocal.  Will obtain labs, EKG and chest x-ray.  Will provide symptomatic treatment.  Disposition pending.    DDX: ACS, GERD, ulcer disease, gastritis    EKG interpreted by me: Sinus rhythm, normal rate, no acute ST elevations, some nonspecific ST depressions, this is an abnormal EKG, these changes do appear new compared to EKG performed in 2016    Serial enzymes are negative.  Lab work notable for mildly elevated lipase.  He feels well.  He now tells me that over the weekend he has drank probably 4 quarts of tomato juice and beer mixture.  I suspect he is probably has some gastritis, may be early pancreatitis.  Nevertheless he feels well and is well-appearing I think he is safe for discharge home.  We discussed supportive measures and outpatient follow-up.  He is happy with this plan.       Amount and/or Complexity of Data Reviewed  Clinical lab tests: reviewed        Final diagnoses:   Chest pain, unspecified type            Mario Mayers MD  09/20/20 0593

## 2020-10-20 RX ORDER — SIMVASTATIN 80 MG
TABLET ORAL
Qty: 90 TABLET | Refills: 0 | Status: SHIPPED | OUTPATIENT
Start: 2020-10-20 | End: 2020-11-02 | Stop reason: SDUPTHER

## 2020-11-02 ENCOUNTER — OFFICE VISIT (OUTPATIENT)
Dept: INTERNAL MEDICINE | Facility: CLINIC | Age: 60
End: 2020-11-02

## 2020-11-02 VITALS
TEMPERATURE: 98 F | HEART RATE: 81 BPM | SYSTOLIC BLOOD PRESSURE: 120 MMHG | WEIGHT: 180.12 LBS | BODY MASS INDEX: 25.22 KG/M2 | HEIGHT: 71 IN | DIASTOLIC BLOOD PRESSURE: 70 MMHG | OXYGEN SATURATION: 98 %

## 2020-11-02 DIAGNOSIS — J43.1 PANLOBULAR EMPHYSEMA (HCC): Primary | ICD-10-CM

## 2020-11-02 DIAGNOSIS — I25.10 MULTIPLE VESSEL CORONARY ARTERY DISEASE: ICD-10-CM

## 2020-11-02 DIAGNOSIS — I10 ESSENTIAL HYPERTENSION: ICD-10-CM

## 2020-11-02 PROBLEM — R63.4 WEIGHT LOSS: Status: RESOLVED | Noted: 2019-12-30 | Resolved: 2020-11-02

## 2020-11-02 PROBLEM — R12 HEARTBURN: Chronic | Status: RESOLVED | Noted: 2019-12-05 | Resolved: 2020-11-02

## 2020-11-02 PROCEDURE — G0439 PPPS, SUBSEQ VISIT: HCPCS | Performed by: INTERNAL MEDICINE

## 2020-11-02 PROCEDURE — 96160 PT-FOCUSED HLTH RISK ASSMT: CPT | Performed by: INTERNAL MEDICINE

## 2020-11-02 RX ORDER — OMEPRAZOLE 20 MG/1
20 CAPSULE, DELAYED RELEASE ORAL DAILY
Qty: 90 CAPSULE | Refills: 3 | Status: SHIPPED | OUTPATIENT
Start: 2020-11-02 | End: 2021-10-26

## 2020-11-02 RX ORDER — AMLODIPINE BESYLATE 5 MG/1
5 TABLET ORAL DAILY
Qty: 90 TABLET | Refills: 3 | Status: SHIPPED | OUTPATIENT
Start: 2020-11-02 | End: 2021-04-29

## 2020-11-02 RX ORDER — ISOSORBIDE MONONITRATE 30 MG/1
30 TABLET, EXTENDED RELEASE ORAL DAILY
Qty: 90 TABLET | Refills: 3 | Status: SHIPPED | OUTPATIENT
Start: 2020-11-02 | End: 2021-12-08

## 2020-11-02 RX ORDER — SIMVASTATIN 80 MG
80 TABLET ORAL EVERY EVENING
Qty: 90 TABLET | Refills: 3 | Status: SHIPPED | OUTPATIENT
Start: 2020-11-02 | End: 2021-01-19 | Stop reason: SDUPTHER

## 2020-11-02 RX ORDER — METOPROLOL SUCCINATE 50 MG/1
50 TABLET, EXTENDED RELEASE ORAL DAILY
Qty: 90 TABLET | Refills: 3 | Status: SHIPPED | OUTPATIENT
Start: 2020-11-02 | End: 2022-02-08

## 2020-11-02 RX ORDER — ESCITALOPRAM OXALATE 10 MG/1
10 TABLET ORAL DAILY
Qty: 90 TABLET | Refills: 3 | Status: SHIPPED | OUTPATIENT
Start: 2020-11-02 | End: 2021-10-26

## 2020-11-02 RX ORDER — NALTREXONE HYDROCHLORIDE 50 MG/1
50 TABLET, FILM COATED ORAL DAILY
Qty: 30 TABLET | Refills: 5 | Status: SHIPPED | OUTPATIENT
Start: 2020-11-02 | End: 2021-05-03

## 2020-11-02 NOTE — PROGRESS NOTES
The ABCs of the Annual Wellness Visit  Subsequent Medicare Wellness Visit    Chief Complaint   Patient presents with   • Medicare Wellness-subsequent       Subjective   History of Present Illness:  Johnny Crow is a 60 y.o. male who presents for a Subsequent Medicare Wellness Visit.    HEALTH RISK ASSESSMENT    Recent Hospitalizations:  No hospitalization(s) within the last year.    Current Medical Providers:  Patient Care Team:  Sagar Cid MD as PCP - General  Shyam Gallardo MD as Consulting Physician (General Surgery)    Smoking Status:  Social History     Tobacco Use   Smoking Status Current Every Day Smoker   • Packs/day: 1.50   • Types: Cigarettes   • Start date: 1976   Smokeless Tobacco Former User   • Types: Snuff   • Quit date: 2017       Alcohol Consumption:  Social History     Substance and Sexual Activity   Alcohol Use Yes   • Alcohol/week: 24.0 standard drinks   • Types: 24 Cans of beer per week    Comment: weekly       Depression Screen:   PHQ-2/PHQ-9 Depression Screening 11/2/2020   Little interest or pleasure in doing things 0   Feeling down, depressed, or hopeless 0   Total Score 0       Fall Risk Screen:  KYMADI Fall Risk Assessment has not been completed.    Health Habits and Functional and Cognitive Screening:  Functional & Cognitive Status 10/10/2019   Do you have difficulty preparing food and eating? No   Do you have difficulty bathing yourself, getting dressed or grooming yourself? No   Do you have difficulty using the toilet? No   Do you have difficulty moving around from place to place? No   Do you have trouble with steps or getting out of a bed or a chair? No   Current Diet Well Balanced Diet   Dental Exam Up to date        Dental Exam Comment -   Eye Exam Up to date   Exercise (times per week) 0 times per week   Current Exercise Activities Include No Regular Exercise   Do you need help using the phone?  No   Are you deaf or do you have serious difficulty hearing?  No   Do you need  help with transportation? No   Do you need help shopping? No   Do you need help preparing meals?  No   Do you need help with housework?  No   Do you need help with laundry? No   Do you need help taking your medications? No   Do you need help managing money? No   Do you ever drive or ride in a car without wearing a seat belt? No   Have you felt unusual stress, anger or loneliness in the last month? No   Who do you live with? Spouse   If you need help, do you have trouble finding someone available to you? No   Have you been bothered in the last four weeks by sexual problems? No   Do you have difficulty concentrating, remembering or making decisions? No         Does the patient have evidence of cognitive impairment? No    Asprin use counseling:Taking ASA appropriately as indicated    Age-appropriate Screening Schedule:  Refer to the list below for future screening recommendations based on patient's age, sex and/or medical conditions. Orders for these recommended tests are listed in the plan section. The patient has been provided with a written plan.    Health Maintenance   Topic Date Due   • TDAP/TD VACCINES (1 - Tdap) 06/14/1979   • INFLUENZA VACCINE  08/01/2020   • ZOSTER VACCINE (1 of 2) 10/10/2029 (Originally 6/14/2010)   • LIPID PANEL  12/19/2020   • COLONOSCOPY  07/31/2025          The following portions of the patient's history were reviewed and updated as appropriate: allergies, current medications, past family history, past medical history, past social history, past surgical history and problem list.    Outpatient Medications Prior to Visit   Medication Sig Dispense Refill   • albuterol sulfate  (90 Base) MCG/ACT inhaler Inhale 2 puffs Every 4 (Four) Hours As Needed for Wheezing. 1 inhaler 11   • amLODIPine (NORVASC) 5 MG tablet TAKE ONE TABLET BY MOUTH ONCE DAILY 90 tablet 2   • aspirin 325 MG tablet Take 1 tablet by mouth daily.     • escitalopram (LEXAPRO) 10 MG tablet Take 1 tablet by mouth Daily.  90 tablet 3   • isosorbide mononitrate (IMDUR) 30 MG 24 hr tablet TAKE ONE TABLET BY MOUTH ONCE DAILY 90 tablet 2   • metoprolol succinate XL (TOPROL-XL) 50 MG 24 hr tablet Take 1 tablet by mouth Daily. 90 tablet 3   • nitroglycerin (NITROSTAT) 0.4 MG SL tablet take 1 tablet under the tongue EVERY 5 MINUTES if needed for chest pain 25 tablet 11   • omeprazole (priLOSEC) 20 MG capsule take 1 capsule by mouth daily 90 capsule 3   • simvastatin (ZOCOR) 80 MG tablet TAKE 1 TABLET BY MOUTH EVERY EVENING 90 tablet 0   • temazepam (RESTORIL) 15 MG capsule Take 1 capsule by mouth At Night As Needed for Sleep. 30 capsule 0   • TiZANidine (ZANAFLEX) 4 MG capsule Take 1 capsule by mouth 3 (Three) Times a Day As Needed for Muscle Spasms. 15 capsule 0   • HYDROcodone-acetaminophen (NORCO) 5-325 MG per tablet Take 1 tablet by mouth Every 6 (Six) Hours As Needed for Severe Pain . 10 tablet 0     No facility-administered medications prior to visit.        Patient Active Problem List   Diagnosis   • Anxiety   • Multiple vessel coronary artery disease   • Hyperlipidemia LDL goal <70   • Gastroesophageal reflux disease without esophagitis   • Essential hypertension   • Emphysema/COPD (CMS/HCC)   • Inflammatory liver disease   • Multiple pulmonary nodules   • Malignant neoplasm of skin   • Tobacco use   • Diarrhea   • Family history of colon cancer   • Polyp of colon       Advanced Care Planning:  ACP discussion was held with the patient during this visit. Patient has an advance directive (not in EMR), copy requested.    Review of Systems   Constitutional: Negative.  Negative for activity change, appetite change, fatigue and fever.   HENT: Negative for congestion, ear discharge, ear pain and trouble swallowing.    Eyes: Negative for photophobia and visual disturbance.   Respiratory: Negative for cough and shortness of breath.    Cardiovascular: Negative for chest pain and palpitations.   Gastrointestinal: Negative for abdominal  "distention, abdominal pain, constipation, diarrhea, nausea and vomiting.   Endocrine: Negative.    Genitourinary: Negative for dysuria, hematuria and urgency.   Musculoskeletal: Negative for arthralgias, back pain, joint swelling and myalgias.   Skin: Negative for color change and rash.   Allergic/Immunologic: Negative.    Neurological: Negative for dizziness, weakness, light-headedness and headaches.   Hematological: Negative for adenopathy. Does not bruise/bleed easily.   Psychiatric/Behavioral: Negative for agitation, confusion and dysphoric mood. The patient is not nervous/anxious.        Compared to one year ago, the patient feels his physical health is the same.  Compared to one year ago, the patient feels his mental health is the same.    Reviewed chart for potential of high risk medication in the elderly: yes  Reviewed chart for potential of harmful drug interactions in the elderly:yes    Objective         Vitals:    11/02/20 1052   BP: 120/70   Pulse: 81   Temp: 98 °F (36.7 °C)   TempSrc: Temporal   SpO2: 98%   Weight: 81.7 kg (180 lb 1.9 oz)   Height: 180.3 cm (71\")   PainSc: 0-No pain       Body mass index is 25.12 kg/m².  Discussed the patient's BMI with him. The BMI is in the acceptable range.    Physical Exam  Constitutional:       General: He is not in acute distress.     Appearance: He is well-developed.   HENT:      Nose: Nose normal.   Eyes:      General: No scleral icterus.     Conjunctiva/sclera: Conjunctivae normal.   Neck:      Thyroid: No thyromegaly.      Trachea: No tracheal deviation.   Cardiovascular:      Rate and Rhythm: Normal rate and regular rhythm.      Heart sounds: No murmur. No friction rub.   Pulmonary:      Effort: No respiratory distress.      Breath sounds: No wheezing or rales.   Abdominal:      General: There is no distension.      Palpations: Abdomen is soft. There is no mass.      Tenderness: There is no abdominal tenderness. There is no guarding.   Musculoskeletal: Normal " range of motion.         General: No deformity.      Comments: Rt gluteal subcut mass. Smooth surface, about 2cms. Non tender   Lymphadenopathy:      Cervical: No cervical adenopathy.   Skin:     General: Skin is warm and dry.      Findings: No erythema or rash.   Neurological:      Mental Status: He is alert and oriented to person, place, and time.      Cranial Nerves: No cranial nerve deficit.      Coordination: Coordination normal.      Deep Tendon Reflexes: Reflexes are normal and symmetric.   Psychiatric:         Behavior: Behavior normal.         Thought Content: Thought content normal.         Judgment: Judgment normal.               Assessment/Plan   Medicare Risks and Personalized Health Plan  CMS Preventative Services Quick Reference  Advance Directive Discussion  Alcohol Misuse  Cardiovascular risk  Polypharmacy  Tobacco Use/Dependance (use dotphrase .tobaccocessation for documentation)    The above risks/problems have been plan with patient with the patient.  Pertinent information has been shared with the patient in the After Visit Summary.  Follow up plans and orders are seen below in the Assessment/Plan Section.    Diagnoses and all orders for this visit:    1. Panlobular emphysema (CMS/[facility]) (Primary) continues to smoke counseled patient has not been able to afford steroid inhaler albuterol as needed    2. Essential hypertension stable with current meds and low-salt diet    3. Multiple vessel coronary artery disease counseled again about smoking cessation continue with the statins nitrates aspirin  He wants to try naltrexone to help with alcohol abuse.  Prescription given discussed and counseled about alcohol cessation  Other orders  -     simvastatin (ZOCOR) 80 MG tablet; Take 1 tablet by mouth Every Evening.  Dispense: 90 tablet; Refill: 3  -     omeprazole (priLOSEC) 20 MG capsule; Take 1 capsule by mouth Daily.  Dispense: 90 capsule; Refill: 3  -     metoprolol succinate XL (TOPROL-XL) 50 MG 24 hr  tablet; Take 1 tablet by mouth Daily.  Dispense: 90 tablet; Refill: 3  -     isosorbide mononitrate (IMDUR) 30 MG 24 hr tablet; Take 1 tablet by mouth Daily.  Dispense: 90 tablet; Refill: 3  -     escitalopram (LEXAPRO) 10 MG tablet; Take 1 tablet by mouth Daily.  Dispense: 90 tablet; Refill: 3  -     amLODIPine (NORVASC) 5 MG tablet; Take 1 tablet by mouth Daily.  Dispense: 90 tablet; Refill: 3      Follow Up:  No follow-ups on file.     An After Visit Summary and PPPS were given to the patient.

## 2020-12-12 ENCOUNTER — HOSPITAL ENCOUNTER (EMERGENCY)
Facility: HOSPITAL | Age: 60
Discharge: HOME | End: 2020-12-13
Attending: STUDENT IN AN ORGANIZED HEALTH CARE EDUCATION/TRAINING PROGRAM | Admitting: STUDENT IN AN ORGANIZED HEALTH CARE EDUCATION/TRAINING PROGRAM

## 2020-12-12 ENCOUNTER — APPOINTMENT (OUTPATIENT)
Dept: GENERAL RADIOLOGY | Facility: HOSPITAL | Age: 60
End: 2020-12-12

## 2020-12-12 DIAGNOSIS — Z86.79 PERSONAL HISTORY OF CORONARY ARTERY DISEASE: ICD-10-CM

## 2020-12-12 DIAGNOSIS — R11.2 NAUSEA AND VOMITING, INTRACTABILITY OF VOMITING NOT SPECIFIED, UNSPECIFIED VOMITING TYPE: Primary | ICD-10-CM

## 2020-12-12 DIAGNOSIS — R74.8 ELEVATED LIPASE: ICD-10-CM

## 2020-12-12 LAB
ALBUMIN SERPL-MCNC: 4.2 G/DL (ref 3.5–5.2)
ALBUMIN/GLOB SERPL: 1.3 G/DL
ALP SERPL-CCNC: 73 U/L (ref 39–117)
ALT SERPL W P-5'-P-CCNC: 32 U/L (ref 1–41)
ANION GAP SERPL CALCULATED.3IONS-SCNC: 10.4 MMOL/L (ref 5–15)
AST SERPL-CCNC: 37 U/L (ref 1–40)
BASOPHILS # BLD AUTO: 0.09 10*3/MM3 (ref 0–0.2)
BASOPHILS NFR BLD AUTO: 0.9 % (ref 0–1.5)
BILIRUB SERPL-MCNC: 0.4 MG/DL (ref 0–1.2)
BUN SERPL-MCNC: 9 MG/DL (ref 8–23)
BUN/CREAT SERPL: 10.6 (ref 7–25)
CALCIUM SPEC-SCNC: 9.5 MG/DL (ref 8.6–10.5)
CHLORIDE SERPL-SCNC: 101 MMOL/L (ref 98–107)
CO2 SERPL-SCNC: 26.6 MMOL/L (ref 22–29)
CREAT SERPL-MCNC: 0.85 MG/DL (ref 0.76–1.27)
D-LACTATE SERPL-SCNC: 1.1 MMOL/L (ref 0.5–2)
DEPRECATED RDW RBC AUTO: 40.2 FL (ref 37–54)
EOSINOPHIL # BLD AUTO: 0.37 10*3/MM3 (ref 0–0.4)
EOSINOPHIL NFR BLD AUTO: 3.7 % (ref 0.3–6.2)
ERYTHROCYTE [DISTWIDTH] IN BLOOD BY AUTOMATED COUNT: 11.6 % (ref 12.3–15.4)
GFR SERPL CREATININE-BSD FRML MDRD: 92 ML/MIN/1.73
GLOBULIN UR ELPH-MCNC: 3.2 GM/DL
GLUCOSE SERPL-MCNC: 129 MG/DL (ref 65–99)
HCT VFR BLD AUTO: 41.5 % (ref 37.5–51)
HGB BLD-MCNC: 15 G/DL (ref 13–17.7)
HOLD SPECIMEN: NORMAL
HOLD SPECIMEN: NORMAL
IMM GRANULOCYTES # BLD AUTO: 0.03 10*3/MM3 (ref 0–0.05)
IMM GRANULOCYTES NFR BLD AUTO: 0.3 % (ref 0–0.5)
LIPASE SERPL-CCNC: 138 U/L (ref 13–60)
LYMPHOCYTES # BLD AUTO: 4.18 10*3/MM3 (ref 0.7–3.1)
LYMPHOCYTES NFR BLD AUTO: 41.4 % (ref 19.6–45.3)
MCH RBC QN AUTO: 34.1 PG (ref 26.6–33)
MCHC RBC AUTO-ENTMCNC: 36.1 G/DL (ref 31.5–35.7)
MCV RBC AUTO: 94.3 FL (ref 79–97)
MONOCYTES # BLD AUTO: 0.83 10*3/MM3 (ref 0.1–0.9)
MONOCYTES NFR BLD AUTO: 8.2 % (ref 5–12)
NEUTROPHILS NFR BLD AUTO: 4.6 10*3/MM3 (ref 1.7–7)
NEUTROPHILS NFR BLD AUTO: 45.5 % (ref 42.7–76)
NRBC BLD AUTO-RTO: 0 /100 WBC (ref 0–0.2)
PLATELET # BLD AUTO: 248 10*3/MM3 (ref 140–450)
PMV BLD AUTO: 9.3 FL (ref 6–12)
POTASSIUM SERPL-SCNC: 3.7 MMOL/L (ref 3.5–5.2)
PROT SERPL-MCNC: 7.4 G/DL (ref 6–8.5)
RBC # BLD AUTO: 4.4 10*6/MM3 (ref 4.14–5.8)
SODIUM SERPL-SCNC: 138 MMOL/L (ref 136–145)
TROPONIN T SERPL-MCNC: <0.01 NG/ML (ref 0–0.03)
WBC # BLD AUTO: 10.1 10*3/MM3 (ref 3.4–10.8)
WHOLE BLOOD HOLD SPECIMEN: NORMAL
WHOLE BLOOD HOLD SPECIMEN: NORMAL

## 2020-12-12 PROCEDURE — 25010000002 ONDANSETRON PER 1 MG: Performed by: STUDENT IN AN ORGANIZED HEALTH CARE EDUCATION/TRAINING PROGRAM

## 2020-12-12 PROCEDURE — 80053 COMPREHEN METABOLIC PANEL: CPT | Performed by: STUDENT IN AN ORGANIZED HEALTH CARE EDUCATION/TRAINING PROGRAM

## 2020-12-12 PROCEDURE — 96374 THER/PROPH/DIAG INJ IV PUSH: CPT

## 2020-12-12 PROCEDURE — 83605 ASSAY OF LACTIC ACID: CPT | Performed by: STUDENT IN AN ORGANIZED HEALTH CARE EDUCATION/TRAINING PROGRAM

## 2020-12-12 PROCEDURE — 84484 ASSAY OF TROPONIN QUANT: CPT | Performed by: STUDENT IN AN ORGANIZED HEALTH CARE EDUCATION/TRAINING PROGRAM

## 2020-12-12 PROCEDURE — 85025 COMPLETE CBC W/AUTO DIFF WBC: CPT | Performed by: STUDENT IN AN ORGANIZED HEALTH CARE EDUCATION/TRAINING PROGRAM

## 2020-12-12 PROCEDURE — 71046 X-RAY EXAM CHEST 2 VIEWS: CPT

## 2020-12-12 PROCEDURE — 99283 EMERGENCY DEPT VISIT LOW MDM: CPT

## 2020-12-12 PROCEDURE — 83690 ASSAY OF LIPASE: CPT | Performed by: STUDENT IN AN ORGANIZED HEALTH CARE EDUCATION/TRAINING PROGRAM

## 2020-12-12 PROCEDURE — 93005 ELECTROCARDIOGRAM TRACING: CPT | Performed by: STUDENT IN AN ORGANIZED HEALTH CARE EDUCATION/TRAINING PROGRAM

## 2020-12-12 RX ORDER — ONDANSETRON 2 MG/ML
8 INJECTION INTRAMUSCULAR; INTRAVENOUS ONCE
Status: COMPLETED | OUTPATIENT
Start: 2020-12-12 | End: 2020-12-12

## 2020-12-12 RX ADMIN — ONDANSETRON 8 MG: 2 INJECTION INTRAMUSCULAR; INTRAVENOUS at 22:46

## 2020-12-13 VITALS
WEIGHT: 181.6 LBS | BODY MASS INDEX: 25.42 KG/M2 | HEIGHT: 71 IN | OXYGEN SATURATION: 91 % | RESPIRATION RATE: 19 BRPM | TEMPERATURE: 98.3 F | HEART RATE: 56 BPM | SYSTOLIC BLOOD PRESSURE: 118 MMHG | DIASTOLIC BLOOD PRESSURE: 73 MMHG

## 2020-12-13 LAB — TROPONIN T SERPL-MCNC: <0.01 NG/ML (ref 0–0.03)

## 2020-12-13 PROCEDURE — 84484 ASSAY OF TROPONIN QUANT: CPT | Performed by: STUDENT IN AN ORGANIZED HEALTH CARE EDUCATION/TRAINING PROGRAM

## 2020-12-13 RX ORDER — ONDANSETRON 4 MG/1
TABLET, ORALLY DISINTEGRATING ORAL
Qty: 20 TABLET | Refills: 0 | Status: SHIPPED | OUTPATIENT
Start: 2020-12-13 | End: 2021-05-03

## 2020-12-13 NOTE — ED PROVIDER NOTES
Subjective   60-year-old male with a past medical history prominent for multiple vessel coronary artery disease requiring stenting as well as a spontaneous pneumothorax and COPD who presents to the emergency department after approximately 1 hour ago feeling like someone poured hot water all over his body and he had 2 episodes of nausea and vomiting.  Symptoms came on suddenly without warning and resolved almost as quickly.  The patient states he feels much better now but states that with his first heart attack that is how he felt.  States that he became very sick to his stomach.  Symptoms resolved spontaneously.  States nothing really brought this on.  He does feel much better now and admits that the symptoms made him panic little bit.          Review of Systems   All other systems reviewed and are negative.      Past Medical History:   Diagnosis Date   • Anxiety    • Arthritis    • Atypical chest pain 3/9/2017   • CAD (coronary artery disease) 2006   • CAD, multiple vessel      History of cardiac stenting to the RCA September 2006 with subsequent cardiac catheterizations in 2010 and 2012 reporting minimal disease with patent stent.2018 LAST STENT PLACED   • Collapse of right lung     1996/1997 HAD A CHEST TUBE AT THIS TIME    • Concussion 2019   • COPD (chronic obstructive pulmonary disease) (CMS/HCC) 2010      Moderate obstruction with previous improvement on Spiriva;  Long-standing history of tobacco abuse; Bullous emphysema noted on CT scan of the chest with history of prior spontaneous pneumothorax.   • Dyslipidemia    • Emphysema of lung (CMS/HCC)    • Fatigue    • GERD (gastroesophageal reflux disease)    • Heart attack (CMS/HCC) 2014    STENT PLACED AT THIS TIME   • Hiatal hernia    • History of kidney stones     PASSED WITHOUT SURGERY 40 YEARS AGO   • Hydropneumothorax      CT scan of the chest 06/17/2014, reports A. probable chronic anterior left chest hydropneumothorax.   • Hyperlipidemia 2006   •  Hypertension 2000   • Hypertriglyceridemia 2006   • Leg pain     Normal KIMMY, 09/13/2006, at [facility].     • Myocardial infarction (CMS/HCC) 2006   • Panic attack 2010   • Pulmonary nodules      CT scan of the chest 06/17/2014 reports scattered 2-3 mm noncalcified nodules in the right middle lobe and left lower lobe, previous CT scan of the chest 09/16/2011 reported multiple 2 mm right middle lobe nodules.   • Renal calculi    • RLS (restless legs syndrome) 2010   • Skin cancer    • Spontaneous pneumothorax     Secondary to bullous emphysema, x2 in 1997.       No Known Allergies    Past Surgical History:   Procedure Laterality Date   • CARDIAC CATHETERIZATION      Catheterization 12/21/2012 reports noncritical coronary artery disease and a widely patent right coronary artery stent.   • CARDIAC CATHETERIZATION N/A 5/26/2017    Procedure: Left Heart Cath;  Surgeon: Anai Sweet MD;  Location: [facility] CATH INVASIVE LOCATION;  Service:    • CARDIAC CATHETERIZATION  02/2018    Male clinic in [facility]   • CHEST TUBE INSERTION  1996, 1997   • COLONOSCOPY  2016   • COLONOSCOPY N/A 7/31/2020    Procedure: COLONOSCOPY WITH BIOPSIES AND POLYPECTOMIES;  Surgeon: Sena Noel MD;  Location: [facility] ENDOSCOPY;  Service: Gastroenterology;  Laterality: N/A;   • CORONARY STENT PLACEMENT  2006   • ENDOSCOPY     • INGUINAL HERNIA REPAIR Right 2009   • POLYPECTOMY  2006    Status post polypectomy x2, noncancerous, spring 2006.   • UPPER GASTROINTESTINAL ENDOSCOPY  over 10 years ago       Family History   Problem Relation Age of Onset   • Heart disease Mother    • Diabetes Father    • Heart disease Father    • Cancer Sister         mass in lower intestine   • Colon cancer Sister 58   • Heart disease Brother    • Liver disease Brother    • Cancer Brother         colon   • Cirrhosis Brother    • Colon cancer Brother    • Crohn's disease Cousin    • Liver cancer Neg Hx    • Ulcerative colitis  Neg Hx    • Esophageal cancer Neg Hx    • Stomach cancer Neg Hx        Social History     Socioeconomic History   • Marital status:      Spouse name: Not on file   • Number of children: Not on file   • Years of education: Not on file   • Highest education level: Not on file   Tobacco Use   • Smoking status: Current Every Day Smoker     Packs/day: 1.50     Types: Cigarettes     Start date: 1976   • Smokeless tobacco: Former User     Types: Snuff     Quit date: 2017   Substance and Sexual Activity   • Alcohol use: Yes     Alcohol/week: 24.0 standard drinks     Types: 24 Cans of beer per week     Comment: weekly   • Drug use: Yes     Types: Marijuana     Comment: EVERY NOW AND THEN   • Sexual activity: Defer           Objective   Physical Exam  Vitals signs and nursing note reviewed.     GEN: No acute distress  Head: Normocephalic, atraumatic  Eyes: Pupils equal round reactive to light  ENT: Posterior pharynx normal in appearance, oral mucosa is moist  Chest: Nontender to palpation  Cardiovascular: Regular rate  Lungs: Clear to auscultation bilaterally  Abdomen: Soft, nontender, nondistended, no peritoneal signs  Extremities: No edema, normal appearance  Neuro: GCS 15  Psych: Mood and affect are appropriate    Procedures           ED Course  ED Course as of Dec 13 0151   Sat Dec 12, 2020   2224 EKG shows a sinus rhythm with a rate of 90.  No significant ST elevations.  There is moderate ST segment depressions that were present on previous EKG from 9/20/2020 or 3 months ago.  This is an abnormal EKG.  Interpreted by me.    [DT]   2258 Lipase(!): 138 [DT]   2259 WBC: 10.10 [DT]   2259 Lactate: 1.1 [DT]   2259 Troponin T: <0.010 [DT]   Sun Dec 13, 2020   0100 Patient is currently sleeping peacefully. In no distress.    [DT]   0132 Troponin T: <0.010 [DT]   0150 XR Chest 2 View  Order: [id number]  Status:  Final result   Visible to patient:  No (not released) Next appt:  12/17/2020 at 09:30 AM in Cardiology (Anai LY  MD Micki)  Details      Reading Physician Reading Date Result Priority  Jennifer Veronica MD  [phone] 12/12/2020     Narrative & Impression    FINAL REPORT     CLINICAL HISTORY:  sob, nausea     FINDINGS:  There is interstitial prominence consistent chronic lung  disease.  There is a thinly encapsulated lucency over the left  heart border in the upper lobe consistent with a large bulla.  There is no infiltrate or effusion.  Heart size is normal.  Aortic knob is calcified.     IMPRESSION:  Bullous emphysema     Authenticated by Jennifer Veronica MD on 12/12/2020 11:12:49 PM            [DT]      ED Course User Index  [DT] Tian Coughlin MD                                           MDM  Number of Diagnoses or Management Options  Elevated lipase:   Nausea and vomiting, intractability of vomiting not specified, unspecified vomiting type:   Personal history of coronary artery disease:   Diagnosis management comments: Will rule him out with serial cardiac enzymes.  At this time he is in no distress having no symptoms.  Will preemptively give him Zofran IV to help prevent further symptoms while we work this up.  We will send blood and urine laboratories including a lipase.  EKG will be obtained and a chest x-ray.       Amount and/or Complexity of Data Reviewed  Clinical lab tests: ordered  Tests in the radiology section of CPT®: reviewed  Decide to obtain previous medical records or to obtain history from someone other than the patient: yes  Obtain history from someone other than the patient: yes  Review and summarize past medical records: yes  Independent visualization of images, tracings, or specimens: yes        Final diagnoses:   Nausea and vomiting, intractability of vomiting not specified, unspecified vomiting type   Elevated lipase   Personal history of coronary artery disease            Tian Coughlin MD  12/13/20 0151

## 2020-12-17 ENCOUNTER — OFFICE VISIT (OUTPATIENT)
Dept: CARDIOLOGY | Facility: CLINIC | Age: 60
End: 2020-12-17

## 2020-12-17 VITALS
BODY MASS INDEX: 25.2 KG/M2 | HEIGHT: 71 IN | SYSTOLIC BLOOD PRESSURE: 131 MMHG | WEIGHT: 180 LBS | HEART RATE: 64 BPM | DIASTOLIC BLOOD PRESSURE: 70 MMHG

## 2020-12-17 DIAGNOSIS — E78.5 HYPERLIPIDEMIA LDL GOAL <70: ICD-10-CM

## 2020-12-17 DIAGNOSIS — I25.10 MULTIPLE VESSEL CORONARY ARTERY DISEASE: Primary | ICD-10-CM

## 2020-12-17 DIAGNOSIS — I10 ESSENTIAL HYPERTENSION: ICD-10-CM

## 2020-12-17 PROCEDURE — 99213 OFFICE O/P EST LOW 20 MIN: CPT | Performed by: INTERNAL MEDICINE

## 2020-12-17 NOTE — PROGRESS NOTES
[facility] Cardiology    Patient ID: Johnny Crow is a 60 y.o. male.  : 1960   Contact: [phone]    Encounter date: 2020    PCP: Sagar Cid MD      Chief complaint:   Chief Complaint   Patient presents with   • Hyperlipidemia LDL goal <70       Problem List:  1. Coronary artery disease:  a. NSTEMI, 2006.  b. LHC, 2006: Primary stenting of the proximal and mid RCA witha 2.5 x 18 mm Cypher SHLOMO; 80% circumflex obtuse marginal subsegment ostial stenosis too small for intervention; 50% mid to distal circumflex stenosis; LAD maximal stenosis of 50%.   c. Cardiolite GXT, 2008: An area of stress-induced basal and mid posterolateral hypoperfusion. EF 50% with mild inferobasilar hypokinesis.    d. [facility], 2008, Dr. Irwin Nascimento: EF 55-60% with total occlusion of a small superior branch of the OM1 with late filling, nonobstructive D1 stenosis.   e. Cardiolite GXT, 2009: Exercise duration of 12 minutes with no chest discomfort. An area of mild stress-induced  basal and mid posterolateral hypoperfusion was seen identical to the previous findings from 2008 at which time cardiac catheterization showed no significant findings. EF 56%.  f. Echocardiogram, 2009, Dr. Calvillo: Exercise duration of 12:20 with a maximal systolic pressure of 202 mmHg and mid and basilar septal and inferior hypokinesis on post exercise images.  g. [facility], 2010: 40% RCA stenosis with an occluded OM3 felt responsible for the patient’s abnormal stress test. Normal LV systolic function.  h. Recurrent angina with negative gallbladder ultrasound, 2012.  i. [facility], 2012, [facility]: Widely patent RCA stent, normal LV systolic function and wall motion, and 40-50% stenosis of D2.  j. [facility], 2017: Successful but complex PTCA/stent placement in the ostial circumflex using a 2.5 x 15 mm Alpine SHLOMO. Residual diagonal disease, occluded OM which is an old finding.  Patent RCA with disease distal to the RCA which does not appear critical. Mildly reduced LV systolic function with lateral hypokinesis prior to intervention.  k. [facility], 2018, [place], [place]: Normal per pt  2. Tobacco use with cessation, 07/07/2014.  3. Dyslipidemia.   4. Hypertension.  5. Leg pain:  a.  Normal KIMMY, 09/13/2006, at [facility].    6. Status  post polypectomy x2, noncancerous, spring 2006.  7. Spontaneous pneumothorax x2.   8. Bilateral lung nodules, left greater than right, currently under treatment by the [facility].    No Known Allergies    Current Medications:    Current Outpatient Medications:   •  albuterol sulfate  (90 Base) MCG/ACT inhaler, Inhale 2 puffs Every 4 (Four) Hours As Needed for Wheezing., Disp: 1 inhaler, Rfl: 11  •  amLODIPine (NORVASC) 5 MG tablet, Take 1 tablet by mouth Daily., Disp: 90 tablet, Rfl: 3  •  aspirin 325 MG tablet, Take 1 tablet by mouth daily., Disp: , Rfl:   •  escitalopram (LEXAPRO) 10 MG tablet, Take 1 tablet by mouth Daily., Disp: 90 tablet, Rfl: 3  •  isosorbide mononitrate (IMDUR) 30 MG 24 hr tablet, Take 1 tablet by mouth Daily., Disp: 90 tablet, Rfl: 3  •  metoprolol succinate XL (TOPROL-XL) 50 MG 24 hr tablet, Take 1 tablet by mouth Daily., Disp: 90 tablet, Rfl: 3  •  naltrexone (DEPADE) 50 MG tablet, Take 1 tablet by mouth Daily., Disp: 30 tablet, Rfl: 5  •  nitroglycerin (NITROSTAT) 0.4 MG SL tablet, take 1 tablet under the tongue EVERY 5 MINUTES if needed for chest pain, Disp: 25 tablet, Rfl: 11  •  omeprazole (priLOSEC) 20 MG capsule, Take 1 capsule by mouth Daily., Disp: 90 capsule, Rfl: 3  •  ondansetron ODT (ZOFRAN-ODT) 4 MG disintegrating tablet, Take one tab by mouth every 6 hours prn nausea and vomiting.  May repeat dose once if still symptomatic, Disp: 20 tablet, Rfl: 0  •  simvastatin (ZOCOR) 80 MG tablet, Take 1 tablet by mouth Every Evening., Disp: 90 tablet, Rfl: 3  •  temazepam (RESTORIL) 15 MG capsule, Take 1 capsule by  "mouth At Night As Needed for Sleep., Disp: 30 capsule, Rfl: 0  •  TiZANidine (ZANAFLEX) 4 MG capsule, Take 1 capsule by mouth 3 (Three) Times a Day As Needed for Muscle Spasms., Disp: 15 capsule, Rfl: 0    HPI    Johnny Crow is a 60 y.o. male who presents today for 12 month follow up of coronary artery disease and cardiac risk factors. Since last visit, he was seen in the ER 5 days ago after feeling flushed while sitting in his recliner and when he checked his blood pressure it was 204/104. He had another episodes of substernal/epigastric pain described as pressure. He had been drinking tomato juice drinks mixed with beer at the time and was told it was likely a GI-related issue. He notes he has been drinking alcohol and smoking up to 2 ppd over the past year. He had been smoking 2 packs per day until his ER visit 5 days ago and has not had any alcohol since then.  His lipase was doubled when in ER and it scared him.  He has been reading about pancreatitis.   He has been exercising multiple times per day on his stationary bike at home without significant cardiopulmonary limitations.      The following portions of the patient's history were reviewed and updated as appropriate: allergies, current medications and problem list.    Pertinent positives as listed in the HPI.  All other systems reviewed are negative.         Vitals:    12/17/20 0915   BP: 131/70   BP Location: Left arm   Patient Position: Sitting   Pulse: 64   Weight: 81.6 kg (180 lb)   Height: 180.3 cm (71\")       Physical Exam:  General: Alert and oriented.  Neck: Jugular venous pressure is within normal limits. Carotids have normal upstrokes without bruits.   Cardiovascular: Heart has a nondisplaced focal PMI. Regular rate and rhythm. No murmur, gallop or rub.  Lungs: Clear, no rales or wheezes. Equal expansion is noted.   Extremities: Show no edema.  Skin: Warm and dry.  Neurologic: Nonfocal.     Diagnostic Data (reviewed with patient):    Lab " Results   Component Value Date    GLUCOSE 129 (H) 12/12/2020    BUN 9 12/12/2020    CREATININE 0.85 12/12/2020    EGFRIFNONA 92 12/12/2020    EGFRIFAFRI 131 10/10/2019    BCR 10.6 12/12/2020     12/12/2020    K 3.7 12/12/2020     12/12/2020    CO2 26.6 12/12/2020    CALCIUM 9.5 12/12/2020    ALBUMIN 4.20 12/12/2020    ALKPHOS 73 12/12/2020    AST 37 12/12/2020    ALT 32 12/12/2020     Lab Results   Component Value Date    CHLPL 119 12/19/2019    TRIG 190 (H) 12/19/2019    HDL 45 12/19/2019    LDL 36 12/19/2019      Lab Results   Component Value Date    WBC 10.10 12/12/2020    RBC 4.40 12/12/2020    HGB 15.0 12/12/2020    HCT 41.5 12/12/2020    MCV 94.3 12/12/2020     12/12/2020      Lab Results   Component Value Date    TSH 0.732 10/10/2019        Procedures      Assessment:    ICD-10-CM ICD-9-CM   1. Multiple vessel coronary artery disease  I25.10 414.00   2. Hyperlipidemia LDL goal <70  E78.5 272.4   3. Essential hypertension  I10 401.9         Plan:  1. Strongly encouraged smoking cessation.  2. Continue aerobic exercise at least 30 minutes per day 5 times per week.  3. Continue daily aspirin, Imdur and NTG as needed for CAD.  4. Continue metoprolol and amlodipine for hypertension.  5. Continue simvastatin for hyperlipidemia.  6. Continue all other current medications.  7. F/up in 12 months, sooner if needed.    I, [employer], attest that this documentation has been prepared under the direction and in the presence of Anai Sweet MD 12/17/2020    I Anai Sweet MD personally performed the services described in this documentation as scribed by the above individual in my presence, and it is both accurate and complete.    Anai Sweet MD, [facility]

## 2020-12-23 ENCOUNTER — TELEPHONE (OUTPATIENT)
Dept: INTERNAL MEDICINE | Facility: CLINIC | Age: 60
End: 2020-12-23

## 2020-12-23 NOTE — TELEPHONE ENCOUNTER
Visit needed to discuss, usually pancreatitis is treated based on symptoms rather than following a lab number.

## 2020-12-23 NOTE — TELEPHONE ENCOUNTER
PATIENT WOULD LIKE TO RECHECK HIS LIPASE (PANCREAS TESTING) BEFORE GOING TO [place].  PATIENT WOULD LIKE ORDER PLEASE.     PLEASE CALL [phone]

## 2021-01-19 RX ORDER — SIMVASTATIN 80 MG
80 TABLET ORAL EVERY EVENING
Qty: 90 TABLET | Refills: 3 | Status: SHIPPED | OUTPATIENT
Start: 2021-01-19 | End: 2021-11-03

## 2021-04-29 RX ORDER — AMLODIPINE BESYLATE 5 MG/1
TABLET ORAL
Qty: 90 TABLET | Refills: 3 | Status: SHIPPED | OUTPATIENT
Start: 2021-04-29 | End: 2022-04-13

## 2021-05-03 ENCOUNTER — OFFICE VISIT (OUTPATIENT)
Dept: INTERNAL MEDICINE | Facility: CLINIC | Age: 61
End: 2021-05-03

## 2021-05-03 VITALS
SYSTOLIC BLOOD PRESSURE: 130 MMHG | TEMPERATURE: 97.5 F | OXYGEN SATURATION: 97 % | HEART RATE: 63 BPM | WEIGHT: 178 LBS | HEIGHT: 71 IN | BODY MASS INDEX: 24.92 KG/M2 | DIASTOLIC BLOOD PRESSURE: 79 MMHG

## 2021-05-03 DIAGNOSIS — I10 ESSENTIAL HYPERTENSION: Primary | ICD-10-CM

## 2021-05-03 DIAGNOSIS — R73.9 HYPERGLYCEMIA: ICD-10-CM

## 2021-05-03 DIAGNOSIS — J43.1 PANLOBULAR EMPHYSEMA (HCC): ICD-10-CM

## 2021-05-03 DIAGNOSIS — I25.10 MULTIPLE VESSEL CORONARY ARTERY DISEASE: ICD-10-CM

## 2021-05-03 PROCEDURE — 99214 OFFICE O/P EST MOD 30 MIN: CPT | Performed by: INTERNAL MEDICINE

## 2021-05-03 RX ORDER — NITROGLYCERIN 0.4 MG/1
0.4 TABLET SUBLINGUAL
Qty: 25 TABLET | Refills: 11 | Status: SHIPPED | OUTPATIENT
Start: 2021-05-03

## 2021-05-03 NOTE — PROGRESS NOTES
"Subjective  Johnny Crow is a 60 y.o. male    HPI coming in for follow-up patient with a history of coronary artery disease and hypertension continues to smoke history of intermittent alcohol use.  No new complaints    The following portions of the patient's history were reviewed and updated as appropriate: allergies, current medications, past family history, past medical history, past social history, past surgical history, and problem list.     Review of Systems   Constitutional: Negative.  Negative for activity change, appetite change, fatigue and fever.   HENT: Negative for congestion, ear discharge, ear pain and trouble swallowing.    Eyes: Negative for photophobia and visual disturbance.   Respiratory: Negative for cough and shortness of breath.    Cardiovascular: Negative for chest pain and palpitations.   Gastrointestinal: Negative for abdominal distention, abdominal pain, constipation, diarrhea, nausea and vomiting.   Endocrine: Negative.    Genitourinary: Negative for dysuria, hematuria and urgency.   Musculoskeletal: Positive for arthralgias. Negative for back pain, joint swelling and myalgias.   Skin: Negative for color change and rash.   Allergic/Immunologic: Negative.    Neurological: Negative for dizziness, weakness, light-headedness and headaches.   Hematological: Negative for adenopathy. Does not bruise/bleed easily.   Psychiatric/Behavioral: Positive for sleep disturbance. Negative for agitation, confusion and dysphoric mood. The patient is not nervous/anxious.        Visit Vitals  /79   Pulse 63   Temp 97.5 °F (36.4 °C) (Infrared)   Ht 180.3 cm (71\")   Wt 80.7 kg (178 lb)   SpO2 97%   BMI 24.83 kg/m²       Objective  Physical Exam  Constitutional:       General: He is not in acute distress.     Appearance: He is well-developed.   HENT:      Nose: Nose normal.   Eyes:      General: No scleral icterus.     Conjunctiva/sclera: Conjunctivae normal.   Neck:      Thyroid: No thyromegaly.      " Trachea: No tracheal deviation.   Cardiovascular:      Rate and Rhythm: Normal rate and regular rhythm.      Heart sounds: No murmur heard.   No friction rub.   Pulmonary:      Effort: No respiratory distress.      Breath sounds: No wheezing or rales.   Abdominal:      General: There is no distension.      Palpations: Abdomen is soft. There is no mass.      Tenderness: There is no abdominal tenderness. There is no guarding.   Musculoskeletal:         General: Deformity present. Normal range of motion.   Lymphadenopathy:      Cervical: No cervical adenopathy.   Skin:     General: Skin is warm and dry.      Findings: No erythema or rash.   Neurological:      Mental Status: He is alert and oriented to person, place, and time.      Cranial Nerves: No cranial nerve deficit.      Coordination: Coordination normal.      Deep Tendon Reflexes: Reflexes are normal and symmetric.   Psychiatric:         Behavior: Behavior normal.         Thought Content: Thought content normal.         Judgment: Judgment normal.         Diagnoses and all orders for this visit:    Essential hypertension stable with current meds advised to follow BP readings at home discussed low-sodium diet    Panlobular emphysema (CMS/HCC) stable counseled about smoking cessation no recent exacerbation    Multiple vessel coronary artery disease stable angina free.  Continue aspirin and beta-blocker nitrates and statin  -     Lipid Panel  -     Hepatitis C Antibody  -     Comprehensive Metabolic Panel    Hyperglycemia-dietary restrictions check A1c  -     Hemoglobin A1c    Other orders  -     nitroglycerin (NITROSTAT) 0.4 MG SL tablet; Place 1 tablet under the tongue Every 5 (Five) Minutes As Needed for Chest Pain. Take no more than 3 doses in 15 minutes.

## 2021-05-04 LAB
ALBUMIN SERPL-MCNC: 4.5 G/DL (ref 3.5–5.2)
ALBUMIN/GLOB SERPL: 1.6 G/DL
ALP SERPL-CCNC: 70 U/L (ref 39–117)
ALT SERPL-CCNC: 38 U/L (ref 1–41)
AST SERPL-CCNC: 40 U/L (ref 1–40)
BILIRUB SERPL-MCNC: 0.9 MG/DL (ref 0–1.2)
BUN SERPL-MCNC: 8 MG/DL (ref 8–23)
BUN/CREAT SERPL: 10.4 (ref 7–25)
CALCIUM SERPL-MCNC: 10.2 MG/DL (ref 8.6–10.5)
CHLORIDE SERPL-SCNC: 101 MMOL/L (ref 98–107)
CHOLEST SERPL-MCNC: 136 MG/DL (ref 0–200)
CO2 SERPL-SCNC: 28.6 MMOL/L (ref 22–29)
CREAT SERPL-MCNC: 0.77 MG/DL (ref 0.76–1.27)
GLOBULIN SER CALC-MCNC: 2.8 GM/DL
GLUCOSE SERPL-MCNC: 80 MG/DL (ref 65–99)
HBA1C MFR BLD: 5.3 % (ref 4.8–5.6)
HCV AB S/CO SERPL IA: <0.1 S/CO RATIO (ref 0–0.9)
HDLC SERPL-MCNC: 57 MG/DL (ref 40–60)
LDLC SERPL CALC-MCNC: 60 MG/DL (ref 0–100)
POTASSIUM SERPL-SCNC: 4.5 MMOL/L (ref 3.5–5.2)
PROT SERPL-MCNC: 7.3 G/DL (ref 6–8.5)
SODIUM SERPL-SCNC: 139 MMOL/L (ref 136–145)
TRIGL SERPL-MCNC: 104 MG/DL (ref 0–150)
VLDLC SERPL CALC-MCNC: 19 MG/DL (ref 5–40)

## 2021-07-06 ENCOUNTER — TELEPHONE (OUTPATIENT)
Dept: INTERNAL MEDICINE | Facility: CLINIC | Age: 61
End: 2021-07-06

## 2021-10-26 RX ORDER — OMEPRAZOLE 20 MG/1
20 CAPSULE, DELAYED RELEASE ORAL DAILY
Qty: 90 CAPSULE | Refills: 3 | Status: SHIPPED | OUTPATIENT
Start: 2021-10-26 | End: 2023-01-25 | Stop reason: SDUPTHER

## 2021-10-26 RX ORDER — ESCITALOPRAM OXALATE 10 MG/1
TABLET ORAL
Qty: 90 TABLET | Refills: 3 | Status: SHIPPED | OUTPATIENT
Start: 2021-10-26 | End: 2023-01-03

## 2021-11-03 RX ORDER — SIMVASTATIN 80 MG
80 TABLET ORAL EVERY EVENING
Qty: 90 TABLET | Refills: 3 | Status: SHIPPED | OUTPATIENT
Start: 2021-11-03 | End: 2022-10-06

## 2021-11-19 ENCOUNTER — OFFICE VISIT (OUTPATIENT)
Dept: INTERNAL MEDICINE | Facility: CLINIC | Age: 61
End: 2021-11-19

## 2021-11-19 VITALS
HEART RATE: 79 BPM | WEIGHT: 180 LBS | OXYGEN SATURATION: 98 % | RESPIRATION RATE: 16 BRPM | DIASTOLIC BLOOD PRESSURE: 73 MMHG | BODY MASS INDEX: 25.2 KG/M2 | SYSTOLIC BLOOD PRESSURE: 129 MMHG | TEMPERATURE: 98 F | HEIGHT: 71 IN

## 2021-11-19 DIAGNOSIS — R82.2 BILIRUBIN IN URINE: ICD-10-CM

## 2021-11-19 DIAGNOSIS — N28.1 RENAL CYST, RIGHT: ICD-10-CM

## 2021-11-19 DIAGNOSIS — I10 ESSENTIAL HYPERTENSION: ICD-10-CM

## 2021-11-19 DIAGNOSIS — F17.200 CURRENT SMOKER: ICD-10-CM

## 2021-11-19 DIAGNOSIS — R10.9 FLANK PAIN: Primary | ICD-10-CM

## 2021-11-19 DIAGNOSIS — Z78.9 ALCOHOL USE: ICD-10-CM

## 2021-11-19 DIAGNOSIS — K76.0 FATTY LIVER: ICD-10-CM

## 2021-11-19 LAB
BILIRUB BLD-MCNC: ABNORMAL MG/DL
CLARITY, POC: ABNORMAL
COLOR UR: YELLOW
EXPIRATION DATE: ABNORMAL
GLUCOSE UR STRIP-MCNC: NEGATIVE MG/DL
KETONES UR QL: NEGATIVE
LEUKOCYTE EST, POC: NEGATIVE
Lab: ABNORMAL
NITRITE UR-MCNC: NEGATIVE MG/ML
PH UR: 6 [PH] (ref 5–8)
PROT UR STRIP-MCNC: NEGATIVE MG/DL
RBC # UR STRIP: NEGATIVE /UL
SP GR UR: 1.02 (ref 1–1.03)
UROBILINOGEN UR QL: NORMAL

## 2021-11-19 PROCEDURE — 81003 URINALYSIS AUTO W/O SCOPE: CPT | Performed by: NURSE PRACTITIONER

## 2021-11-19 PROCEDURE — 99214 OFFICE O/P EST MOD 30 MIN: CPT | Performed by: NURSE PRACTITIONER

## 2021-11-19 NOTE — PROGRESS NOTES
Chief Complaint / Reason:      Chief Complaint   Patient presents with   • Flank Pain     right kidney. had xray years ago and showed right cyst per patient.        Subjective     HPI  Patient presents today with complaints of flank pain on right side but denies any blood in urine or stool that he is aware of.  He states years ago he did have x-ray showing right renal cyst and over the past month or so he has been having pain here and there.  Denies any significant weight loss but he is a current every day smoker and he does consume alcohol.  Does have a history of fatty liver denies any significant weight loss but states he has been more tired than normal.  Patient denies chest pain, shortness of breath or heart palpitations blood pressure is controlled.  History taken from: patient    PMH/FH/Social History were reviewed and updated appropriately in the electronic medical record.   Past Medical History:   Diagnosis Date   • Anxiety    • Arthritis    • Atypical chest pain 3/9/2017   • CAD (coronary artery disease) 2006   • CAD, multiple vessel      History of cardiac stenting to the RCA September 2006 with subsequent cardiac catheterizations in 2010 and 2012 reporting minimal disease with patent stent.2018 LAST STENT PLACED   • Collapse of right lung     1996/1997 HAD A CHEST TUBE AT THIS TIME    • Concussion 2019   • COPD (chronic obstructive pulmonary disease) ([facility]) 2010      Moderate obstruction with previous improvement on Spiriva;  Long-standing history of tobacco abuse; Bullous emphysema noted on CT scan of the chest with history of prior spontaneous pneumothorax.   • Dyslipidemia    • Emphysema of lung ([facility])    • Fatigue    • GERD (gastroesophageal reflux disease)    • Heart attack ([facility]) 2014    STENT PLACED AT THIS TIME   • Hiatal hernia    • History of kidney stones     PASSED WITHOUT SURGERY 40 YEARS AGO   • Hydropneumothorax      CT scan of the chest 06/17/2014, reports A. probable chronic anterior left  chest hydropneumothorax.   • Hyperlipidemia 2006   • Hypertension 2000   • Hypertriglyceridemia 2006   • Leg pain     Normal KIMMY, 09/13/2006, at [facility].     • Myocardial infarction (HCC) 2006   • Panic attack 2010   • Pulmonary nodules      CT scan of the chest 06/17/2014 reports scattered 2-3 mm noncalcified nodules in the right middle lobe and left lower lobe, previous CT scan of the chest 09/16/2011 reported multiple 2 mm right middle lobe nodules.   • Renal calculi    • RLS (restless legs syndrome) 2010   • Skin cancer    • Spontaneous pneumothorax     Secondary to bullous emphysema, x2 in 1997.     Past Surgical History:   Procedure Laterality Date   • CARDIAC CATHETERIZATION      Catheterization 12/21/2012 reports noncritical coronary artery disease and a widely patent right coronary artery stent.   • CARDIAC CATHETERIZATION N/A 5/26/2017    Procedure: Left Heart Cath;  Surgeon: Anai Sweet MD;  Location: [facility] CATH INVASIVE LOCATION;  Service:    • CARDIAC CATHETERIZATION  02/2018    Male clinic in [facility]   • CHEST TUBE INSERTION  1996, 1997   • COLONOSCOPY  2016   • COLONOSCOPY N/A 7/31/2020    Procedure: COLONOSCOPY WITH BIOPSIES AND POLYPECTOMIES;  Surgeon: Sena Noel MD;  Location: [facility] ENDOSCOPY;  Service: Gastroenterology;  Laterality: N/A;   • CORONARY STENT PLACEMENT  2006   • ENDOSCOPY     • INGUINAL HERNIA REPAIR Right 2009   • POLYPECTOMY  2006    Status post polypectomy x2, noncancerous, spring 2006.   • UPPER GASTROINTESTINAL ENDOSCOPY  over 10 years ago     Social History     Socioeconomic History   • Marital status:    Tobacco Use   • Smoking status: Current Every Day Smoker     Packs/day: 1.50     Types: Cigarettes     Start date: 1976   • Smokeless tobacco: Former User     Types: Snuff     Quit date: 2017   Substance and Sexual Activity   • Alcohol use: Yes     Alcohol/week: 24.0 standard drinks     Types: 24 Cans of beer per  week     Comment: weekly   • Drug use: Yes     Types: Marijuana     Comment: EVERY NOW AND THEN   • Sexual activity: Defer     Family History   Problem Relation Age of Onset   • Heart disease Mother    • Diabetes Father    • Heart disease Father    • Cancer Sister         mass in lower intestine   • Colon cancer Sister 58   • Heart disease Brother    • Liver disease Brother    • Cancer Brother         colon   • Cirrhosis Brother    • Colon cancer Brother    • Crohn's disease Cousin    • Liver cancer Neg Hx    • Ulcerative colitis Neg Hx    • Esophageal cancer Neg Hx    • Stomach cancer Neg Hx        Review of Systems:   Review of Systems   Constitutional: Positive for fatigue.   Respiratory: Negative.    Cardiovascular: Negative.    Genitourinary: Positive for flank pain.   Musculoskeletal: Positive for arthralgias, back pain and myalgias.   Allergic/Immunologic: Positive for environmental allergies.   Neurological: Negative.    Psychiatric/Behavioral: Positive for sleep disturbance and stress.         All other systems were reviewed and are negative.  Exceptions are noted in the subjective or above.      Objective     Vital Signs  Vitals:    11/19/21 1308   BP: 129/73   Pulse: 79   Resp: 16   Temp: 98 °F (36.7 °C)   SpO2: 98%       Body mass index is 25.1 kg/m².    Physical Exam  Vitals and nursing note reviewed.   Constitutional:       Appearance: He is well-developed.   Cardiovascular:      Rate and Rhythm: Normal rate and regular rhythm.      Pulses: Normal pulses.      Heart sounds: Normal heart sounds.   Pulmonary:      Effort: Pulmonary effort is normal.      Breath sounds: Normal breath sounds.   Chest:      Chest wall: No tenderness.   Abdominal:      General: Bowel sounds are normal. There is no distension.      Palpations: Abdomen is soft. There is no mass.      Tenderness: There is abdominal tenderness. There is right CVA tenderness. There is no left CVA tenderness, guarding or rebound.      Hernia: No  hernia is present.   Skin:     General: Skin is warm and dry.   Neurological:      Mental Status: He is alert and oriented to person, place, and time.   Psychiatric:         Behavior: Behavior normal.         Thought Content: Thought content normal.         Judgment: Judgment normal.              Results Review:    I reviewed the patient's new clinical results.   Office Visit on 11/19/2021   Component Date Value Ref Range Status   • Color 11/19/2021 Yellow  Yellow, Straw, Dark Yellow, Michell Final   • Clarity, UA 11/19/2021 Cloudy* Clear Final   • Specific Gravity  11/19/2021 1.025  1.005 - 1.030 Final   • pH, Urine 11/19/2021 6.0  5.0 - 8.0 Final   • Leukocytes 11/19/2021 Negative  Negative Final   • Nitrite, UA 11/19/2021 Negative  Negative Final   • Protein, POC 11/19/2021 Negative  Negative mg/dL Final   • Glucose, UA 11/19/2021 Negative  Negative, 1000 mg/dL (3+) mg/dL Final   • Ketones, UA 11/19/2021 Negative  Negative Final   • Urobilinogen, UA 11/19/2021 Normal  Normal Final   • Bilirubin 11/19/2021 1 mg/dL* Negative Final   • Blood, UA 11/19/2021 Negative  Negative Final   • Lot Number 11/19/2021 [id number]   Final   • Expiration Date 11/19/2021 03/18/2023   Final         Medication Review:     Current Outpatient Medications:   •  albuterol sulfate  (90 Base) MCG/ACT inhaler, Inhale 2 puffs Every 4 (Four) Hours As Needed for Wheezing., Disp: 1 inhaler, Rfl: 11  •  amLODIPine (NORVASC) 5 MG tablet, TAKE 1 TABLET BY MOUTH ONCE DAILY, Disp: 90 tablet, Rfl: 3  •  aspirin 325 MG tablet, Take 1 tablet by mouth daily., Disp: , Rfl:   •  escitalopram (LEXAPRO) 10 MG tablet, TAKE 1 TABLET BY MOUTH ONCE DAILY, Disp: 90 tablet, Rfl: 3  •  isosorbide mononitrate (IMDUR) 30 MG 24 hr tablet, Take 1 tablet by mouth Daily., Disp: 90 tablet, Rfl: 3  •  metoprolol succinate XL (TOPROL-XL) 50 MG 24 hr tablet, Take 1 tablet by mouth Daily., Disp: 90 tablet, Rfl: 3  •  nitroglycerin (NITROSTAT) 0.4 MG SL tablet, Place  1 tablet under the tongue Every 5 (Five) Minutes As Needed for Chest Pain. Take no more than 3 doses in 15 minutes., Disp: 25 tablet, Rfl: 11  •  omeprazole (priLOSEC) 20 MG capsule, TAKE 1 CAPSULE BY MOUTH DAILY, Disp: 90 capsule, Rfl: 3  •  simvastatin (ZOCOR) 80 MG tablet, TAKE 1 TABLET BY MOUTH EVERY EVENING, Disp: 90 tablet, Rfl: 3  •  temazepam (RESTORIL) 15 MG capsule, Take 1 capsule by mouth At Night As Needed for Sleep., Disp: 30 capsule, Rfl: 0    Diagnoses and all orders for this visit:    Flank pain  -     POCT urinalysis dipstick, automated  -     CT Abdomen Pelvis With & Without Contrast    Essential hypertension  Stable without worsening signs and symptoms  Bilirubin in urine  -     CT Abdomen Pelvis With & Without Contrast  Discussed differential diagnosis and recommend maintaining hydration  Fatty liver  Advised patient to limit alcohol intake we will do a CT scan of abdomen pelvis  Renal cyst, right  CT scan of abdomen and pelvis.  Recommend smoking cessation  Alcohol use  Limit alcohol intake  Current smoker  Advised patient to stop smoking and discussed risk factors associated with continued use    Collaborated with Dr. Cid regarding plan of care.    Return if symptoms worsen or fail to improve.    Karina Carlisle, APRN  11/19/2021

## 2021-11-23 ENCOUNTER — TELEPHONE (OUTPATIENT)
Dept: INTERNAL MEDICINE | Facility: CLINIC | Age: 61
End: 2021-11-23

## 2021-11-23 NOTE — TELEPHONE ENCOUNTER
Caller: Johnny Crow    Relationship: Self    Best call back number: [phone]    What is the best time to reach you: ANYTIME     Who are you requesting to speak with (clinical staff, provider,  specific staff member): MELISSA RENDON OR NURSE         What was the call regarding: THE PATIENT STATES THAT HE WAS SEEN ON 11/19/2021 AND THE DOCTOR WAS GOING TO SET UP AN ULTRASOUND FOR HIS KIDNEY. THE PATIENT WOULD LIKE TO KNOW IF THAT WAS SET UP YET THE PATIENT STATES THAT [place] AND HE WOULD LIKE TO HAVE THE ULTRASOUND DONE BEFORE THEN    Do you require a callback: YES

## 2021-11-24 NOTE — TELEPHONE ENCOUNTER
Contacted patient and notified that he is scheduled for a CT and not an ultrasound; patient expressed understanding and stated he would follow up after results

## 2021-11-26 ENCOUNTER — HOSPITAL ENCOUNTER (OUTPATIENT)
Dept: CT IMAGING | Facility: HOSPITAL | Age: 61
Discharge: HOME | End: 2021-11-26
Admitting: NURSE PRACTITIONER

## 2021-11-26 LAB — CREAT BLDA-MCNC: 0.8 MG/DL (ref 0.6–1.3)

## 2021-11-26 PROCEDURE — 25010000002 IOPAMIDOL 61 % SOLUTION: Performed by: NURSE PRACTITIONER

## 2021-11-26 PROCEDURE — 74178 CT ABD&PLV WO CNTR FLWD CNTR: CPT

## 2021-11-26 PROCEDURE — 82565 ASSAY OF CREATININE: CPT

## 2021-11-26 RX ADMIN — IOPAMIDOL 100 ML: 612 INJECTION, SOLUTION INTRAVENOUS at 17:11

## 2021-11-29 ENCOUNTER — TELEPHONE (OUTPATIENT)
Dept: INTERNAL MEDICINE | Facility: CLINIC | Age: 61
End: 2021-11-29

## 2021-11-29 NOTE — TELEPHONE ENCOUNTER
Caller: Johnny Crow    Relationship: Self    Best call back number: [phone]    What is the best time to reach you: N/A    Who are you requesting to speak with (clinical staff, provider,  specific staff member): DR SIDDIQI OR NURSE    Do you know the name of the person who called:     What was the call regarding:  PT WOULD LIKE TO SPEAK WITH SOME ONE IN REGARDS TO CT SCAN RESULTS    Do you require a callback:YES

## 2021-11-30 DIAGNOSIS — E78.5 HYPERLIPIDEMIA LDL GOAL <70: ICD-10-CM

## 2021-11-30 DIAGNOSIS — I10 ESSENTIAL HYPERTENSION: ICD-10-CM

## 2021-11-30 DIAGNOSIS — Z86.79: ICD-10-CM

## 2021-11-30 DIAGNOSIS — K75.9 INFLAMMATORY LIVER DISEASE: ICD-10-CM

## 2021-11-30 DIAGNOSIS — N40.0 ENLARGED PROSTATE: Primary | ICD-10-CM

## 2021-11-30 DIAGNOSIS — N28.1 RENAL CYST, RIGHT: ICD-10-CM

## 2021-12-01 NOTE — PROGRESS NOTES
[facility] Cardiology    Patient ID: Johnny Crow is a 61 y.o. male.  : 1960   Contact: [phone]    Encounter date: 2021    PCP: Sagar Cid MD      Chief complaint:   Chief Complaint   Patient presents with   • Multiple vessel coronary artery disease       Problem List:  1. Coronary artery disease:  a. NSTEMI, 2006.  b. LHC, 2006: Primary stenting of the proximal and mid RCA witha 2.5 x 18 mm Cypher SHLOMO; 80% circumflex obtuse marginal subsegment ostial stenosis too small for intervention; 50% mid to distal circumflex stenosis; LAD maximal stenosis of 50%.   c. Cardiolite GXT, 2008: An area of stress-induced basal and mid posterolateral hypoperfusion. EF 50% with mild inferobasilar hypokinesis.    d. [facility], 2008, Dr. Irwin Nascimento: EF 55-60% with total occlusion of a small superior branch of the OM1 with late filling, nonobstructive D1 stenosis.   e. Cardiolite GXT, 2009: Exercise duration of 12 minutes with no chest discomfort. An area of mild stress-induced  basal and mid posterolateral hypoperfusion was seen identical to the previous findings from 2008 at which time cardiac catheterization showed no significant findings. EF 56%.  f. Echocardiogram, 2009, Dr. Calvillo: Exercise duration of 12:20 with a maximal systolic pressure of 202 mmHg and mid and basilar septal and inferior hypokinesis on post exercise images.  g. [facility], 2010: 40% RCA stenosis with an occluded OM3 felt responsible for the patient’s abnormal stress test. Normal LV systolic function.  h. Recurrent angina with negative gallbladder ultrasound, 2012.  i. [facility], 2012, [facility]: Widely patent RCA stent, normal LV systolic function and wall motion, and 40-50% stenosis of D2.  j. [facility], 2017: Successful but complex PTCA/stent placement in the ostial circumflex using a 2.5 x 15 mm Alpine SHLOMO. Residual diagonal disease, occluded OM which is an  old finding. Patent RCA with disease distal to the RCA which does not appear critical. Mildly reduced LV systolic function with lateral hypokinesis prior to intervention.  k. [facility], 2018, [place], [place]: Normal per pt  2. Tobacco use with cessation, 07/07/2014.  3. Dyslipidemia.   4. Hypertension.  5. Leg pain:  a.  Normal KIMMY, 09/13/2006, at [facility].    6. Status  post polypectomy x2, noncancerous, spring 2006.  7. Spontaneous pneumothorax x2.   8. Bilateral lung nodules, left greater than right, currently under treatment by the [facility].    No Known Allergies    Current Medications:    Current Outpatient Medications:   •  albuterol sulfate  (90 Base) MCG/ACT inhaler, Inhale 2 puffs Every 4 (Four) Hours As Needed for Wheezing., Disp: 1 inhaler, Rfl: 11  •  amLODIPine (NORVASC) 5 MG tablet, TAKE 1 TABLET BY MOUTH ONCE DAILY, Disp: 90 tablet, Rfl: 3  •  aspirin 325 MG tablet, Take 1 tablet by mouth daily., Disp: , Rfl:   •  escitalopram (LEXAPRO) 10 MG tablet, TAKE 1 TABLET BY MOUTH ONCE DAILY, Disp: 90 tablet, Rfl: 3  •  isosorbide mononitrate (IMDUR) 30 MG 24 hr tablet, Take 1 tablet by mouth Daily., Disp: 90 tablet, Rfl: 3  •  metoprolol succinate XL (TOPROL-XL) 50 MG 24 hr tablet, Take 1 tablet by mouth Daily., Disp: 90 tablet, Rfl: 3  •  nitroglycerin (NITROSTAT) 0.4 MG SL tablet, Place 1 tablet under the tongue Every 5 (Five) Minutes As Needed for Chest Pain. Take no more than 3 doses in 15 minutes., Disp: 25 tablet, Rfl: 11  •  omeprazole (priLOSEC) 20 MG capsule, TAKE 1 CAPSULE BY MOUTH DAILY, Disp: 90 capsule, Rfl: 3  •  simvastatin (ZOCOR) 80 MG tablet, TAKE 1 TABLET BY MOUTH EVERY EVENING, Disp: 90 tablet, Rfl: 3  •  temazepam (RESTORIL) 15 MG capsule, Take 1 capsule by mouth At Night As Needed for Sleep., Disp: 30 capsule, Rfl: 0    HPI    Johnny Crow is a 61 y.o. male who presents today for an annual follow up of coronary artery disease and cardiac risk factors. Since last  "visit, the patient has been doing well overall from a cardiovascular standpoint. He rides a stationary bike 3-4 times a week for exercise and denies any cardiac limitations. He recently had imaging done that revealed an enlarged prostate. He mentions while in [place] this past year he occasionally experienced tongue numbness after drinking 3-4 beers. Patient denies chest pain, shortness of breath, palpitations, edema, dizziness, and syncope.       The following portions of the patient's history were reviewed and updated as appropriate: allergies, current medications and problem list.    Pertinent positives as listed in the HPI.  All other systems reviewed are negative.         Vitals:    12/02/21 0940   BP: 130/60   BP Location: Left arm   Patient Position: Sitting   Pulse: 69   SpO2: 95%   Weight: 81.6 kg (180 lb)   Height: 180.3 cm (71\")       Physical Exam:  General: Alert and oriented.  Neck: Jugular venous pressure is within normal limits. Carotids have normal upstrokes without bruits.   Cardiovascular: Heart has a nondisplaced focal PMI. Regular rate and rhythm. No murmur, gallop or rub.  Lungs: Clear, no rales or wheezes. Equal expansion is noted.   Extremities: Show no edema.  Skin: Warm and dry.  Neurologic: Nonfocal.     Diagnostic Data (reviewed with patient):  Lab Results   Component Value Date    GLUCOSE 95 11/30/2021    BUN 4 (L) 11/30/2021    CREATININE 0.76 11/30/2021    EGFRIFNONA 104 11/30/2021    EGFRIFAFRI 126 11/30/2021    BCR 5.3 (L) 11/30/2021     11/30/2021    K 4.6 11/30/2021     11/30/2021    CO2 31.1 (H) 11/30/2021    CALCIUM 9.2 11/30/2021    ALBUMIN 4.30 11/30/2021    ALKPHOS 70 11/30/2021    AST 30 11/30/2021    ALT 21 11/30/2021     Lab Results   Component Value Date    CHLPL 136 05/03/2021    TRIG 146 11/30/2021    HDL 57 05/03/2021    LDL 60 05/03/2021      Lab Results   Component Value Date    WBC 10.10 12/12/2020    RBC 4.40 12/12/2020    HGB 15.0 12/12/2020    HCT 41.5 " 12/12/2020    MCV 94.3 12/12/2020     12/12/2020        Procedures      Assessment:    ICD-10-CM ICD-9-CM   1. Multiple vessel coronary artery disease  I25.10 414.00   2. Essential hypertension  I10 401.9   3. Hyperlipidemia LDL goal <70  E78.5 272.4         Plan:  1. Stable cardiac status. Continue exercise  2. Recommended a decrease in beer consumption to 1-2 beers to avoid tongue numbness.   3. Continue on aspirin 325 mg for antiplatelet therapy, Imdur 30 mg daily for chest pain, and nitroglycerin 0.4 mg as needed.   4. Continue on metoprolol 50 mg and amlodipine 5 mg daily for hypertension.   5. Continue on simvastatin 80 mg nightly for hyperlipidemia.   6. Continue all other current medications.  7. F/up in 12 months, sooner if needed.    Scribed for Anai Sweet MD by Kathryn Schaeffer. 12/2/2021 09:45 EST    I Anai Sweet MD personally performed the services described in this documentation as scribed by the above individual in my presence, and it is both accurate and complete.    Anai Sweet MD, FACC

## 2021-12-02 ENCOUNTER — OFFICE VISIT (OUTPATIENT)
Dept: CARDIOLOGY | Facility: CLINIC | Age: 61
End: 2021-12-02

## 2021-12-02 VITALS
WEIGHT: 180 LBS | BODY MASS INDEX: 25.2 KG/M2 | HEIGHT: 71 IN | SYSTOLIC BLOOD PRESSURE: 130 MMHG | DIASTOLIC BLOOD PRESSURE: 60 MMHG | OXYGEN SATURATION: 95 % | HEART RATE: 69 BPM

## 2021-12-02 DIAGNOSIS — I10 ESSENTIAL HYPERTENSION: ICD-10-CM

## 2021-12-02 DIAGNOSIS — I25.10 MULTIPLE VESSEL CORONARY ARTERY DISEASE: Primary | ICD-10-CM

## 2021-12-02 DIAGNOSIS — E78.5 HYPERLIPIDEMIA LDL GOAL <70: ICD-10-CM

## 2021-12-02 LAB
A2 MACROGLOB SERPL-MCNC: 138 MG/DL (ref 110–276)
ALBUMIN SERPL-MCNC: 4.3 G/DL (ref 3.5–5.2)
ALBUMIN/GLOB SERPL: 1.6 G/DL
ALP SERPL-CCNC: 70 U/L (ref 39–117)
ALT SERPL W P-5'-P-CCNC: 22 IU/L (ref 0–55)
ALT SERPL-CCNC: 21 U/L (ref 1–41)
APO A-I SERPL-MCNC: 115 MG/DL (ref 101–178)
AST SERPL W P-5'-P-CCNC: 30 IU/L (ref 0–40)
AST SERPL-CCNC: 30 U/L (ref 1–40)
BILIRUB SERPL-MCNC: 0.7 MG/DL (ref 0–1.2)
BILIRUB SERPL-MCNC: 0.8 MG/DL (ref 0–1.2)
BUN SERPL-MCNC: 4 MG/DL (ref 8–23)
BUN/CREAT SERPL: 5.3 (ref 7–25)
CALCIUM SERPL-MCNC: 9.2 MG/DL (ref 8.6–10.5)
CHLORIDE SERPL-SCNC: 100 MMOL/L (ref 98–107)
CHOLEST SERPL-MCNC: 106 MG/DL (ref 100–199)
CO2 SERPL-SCNC: 31.1 MMOL/L (ref 22–29)
CREAT SERPL-MCNC: 0.76 MG/DL (ref 0.76–1.27)
FIBROSIS SCORING:: ABNORMAL
FIBROSIS STAGE SERPL QL: ABNORMAL
GGT SERPL-CCNC: 59 IU/L (ref 0–65)
GLOBULIN SER CALC-MCNC: 2.7 GM/DL
GLUCOSE SERPL-MCNC: 95 MG/DL (ref 65–99)
GLUCOSE SERPL-MCNC: 98 MG/DL (ref 65–99)
HAPTOGLOB SERPL-MCNC: 94 MG/DL (ref 32–363)
INTERPRETATIONS: (REFERENCE): ABNORMAL
LABORATORY COMMENT REPORT: ABNORMAL
LIVER FIBR SCORE SERPL CALC.FIBROSURE: 0.37 (ref 0–0.21)
NASH SCORING (REFERENCE): ABNORMAL
NECROINFLAMMATORY ACT GRADE SERPL QL: ABNORMAL
NECROINFLAMMATORY ACT SCORE SERPL: 0.5
POTASSIUM SERPL-SCNC: 4.6 MMOL/L (ref 3.5–5.2)
PROT SERPL-MCNC: 7 G/DL (ref 6–8.5)
PSA SERPL-MCNC: 1.36 NG/ML (ref 0–4)
SERVICE CMNT-IMP: ABNORMAL
SODIUM SERPL-SCNC: 139 MMOL/L (ref 136–145)
STEATOSIS GRADE (REFERENCE): ABNORMAL
STEATOSIS GRADING (REFERENCE): ABNORMAL
STEATOSIS SCORE (REFERENCE): 0.55 (ref 0–0.3)
TRIGL SERPL-MCNC: 146 MG/DL (ref 0–149)

## 2021-12-02 PROCEDURE — 99214 OFFICE O/P EST MOD 30 MIN: CPT | Performed by: INTERNAL MEDICINE

## 2021-12-08 DIAGNOSIS — I10 ESSENTIAL HYPERTENSION: Primary | ICD-10-CM

## 2021-12-08 RX ORDER — ISOSORBIDE MONONITRATE 30 MG/1
TABLET, EXTENDED RELEASE ORAL
Qty: 90 TABLET | Refills: 3 | Status: SHIPPED | OUTPATIENT
Start: 2021-12-08 | End: 2023-01-03

## 2021-12-08 NOTE — TELEPHONE ENCOUNTER
Rx Refill Note  Requested Prescriptions     Pending Prescriptions Disp Refills   • isosorbide mononitrate (IMDUR) 30 MG 24 hr tablet [Pharmacy Med Name: ISOSORBIDE MONONITRATE 30MG ER TABS] 90 tablet 3     Sig: TAKE 1 TABLET BY MOUTH ONCE DAILY      Last office visit with prescribing clinician: 5/3/2021      Next office visit with prescribing clinician: Visit date not found            MELISSA WHEELER MA  12/08/21, 13:08 EST

## 2021-12-09 ENCOUNTER — TELEPHONE (OUTPATIENT)
Dept: INTERNAL MEDICINE | Facility: CLINIC | Age: 61
End: 2021-12-09

## 2021-12-09 NOTE — TELEPHONE ENCOUNTER
I attempt to contact patient but was unable to reach him to discuss results.  We will try again later

## 2021-12-09 NOTE — TELEPHONE ENCOUNTER
Caller: Johnny Crow    Relationship: Self    Best call back number:     Caller requesting test results: SELF    What test was performed: LABS    When was the test performed: 2 WKS AGO    Where was the test performed: [facility]    Additional notes: HAS QUESTIONS REGARDING RESULTS

## 2021-12-28 NOTE — TELEPHONE ENCOUNTER
Do you care to review the CT results? I can call patient once they have been reviewed    (V4) confused

## 2022-02-08 RX ORDER — METOPROLOL SUCCINATE 50 MG/1
TABLET, EXTENDED RELEASE ORAL
Qty: 90 TABLET | Refills: 3 | Status: SHIPPED | OUTPATIENT
Start: 2022-02-08 | End: 2023-01-13 | Stop reason: SDUPTHER

## 2022-02-14 ENCOUNTER — TELEPHONE (OUTPATIENT)
Dept: INTERNAL MEDICINE | Facility: CLINIC | Age: 62
End: 2022-02-14

## 2022-02-18 ENCOUNTER — OFFICE VISIT (OUTPATIENT)
Dept: INTERNAL MEDICINE | Facility: CLINIC | Age: 62
End: 2022-02-18

## 2022-02-18 VITALS
SYSTOLIC BLOOD PRESSURE: 120 MMHG | WEIGHT: 177.8 LBS | TEMPERATURE: 97.3 F | BODY MASS INDEX: 24.89 KG/M2 | DIASTOLIC BLOOD PRESSURE: 70 MMHG | HEIGHT: 71 IN | HEART RATE: 71 BPM | OXYGEN SATURATION: 97 %

## 2022-02-18 DIAGNOSIS — R05.9 COUGH: ICD-10-CM

## 2022-02-18 DIAGNOSIS — J43.1 PANLOBULAR EMPHYSEMA: ICD-10-CM

## 2022-02-18 DIAGNOSIS — I25.10 MULTIPLE VESSEL CORONARY ARTERY DISEASE: ICD-10-CM

## 2022-02-18 DIAGNOSIS — I10 ESSENTIAL HYPERTENSION: Primary | ICD-10-CM

## 2022-02-18 PROCEDURE — 1159F MED LIST DOCD IN RCRD: CPT | Performed by: INTERNAL MEDICINE

## 2022-02-18 PROCEDURE — 99396 PREV VISIT EST AGE 40-64: CPT | Performed by: INTERNAL MEDICINE

## 2022-02-18 PROCEDURE — 1126F AMNT PAIN NOTED NONE PRSNT: CPT | Performed by: INTERNAL MEDICINE

## 2022-02-18 PROCEDURE — G0439 PPPS, SUBSEQ VISIT: HCPCS | Performed by: INTERNAL MEDICINE

## 2022-02-18 PROCEDURE — 1170F FXNL STATUS ASSESSED: CPT | Performed by: INTERNAL MEDICINE

## 2022-02-18 PROCEDURE — 96160 PT-FOCUSED HLTH RISK ASSMT: CPT | Performed by: INTERNAL MEDICINE

## 2022-02-18 PROCEDURE — U0004 COV-19 TEST NON-CDC HGH THRU: HCPCS | Performed by: INTERNAL MEDICINE

## 2022-02-18 NOTE — PROGRESS NOTES
The ABCs of the Annual Wellness Visit  Subsequent Medicare Wellness Visit    Chief Complaint   Patient presents with   • Medicare Wellness-subsequent     cough x 2 weeks - getting better, home covid test neg. slowly getting better .Surgery scheduled for 2/24 needing covid test       Subjective    History of Present Illness:  Johnny Crow is a 61 y.o. male who presents for a Subsequent Medicare Wellness Visit.    The following portions of the patient's history were reviewed and   updated as appropriate: allergies, current medications, past family history, past medical history, past social history, past surgical history and problem list.    Compared to one year ago, the patient feels his physical   health is the same.    Compared to one year ago, the patient feels his mental   health is the same.    Recent Hospitalizations:  He was not admitted to the hospital during the last year.       Current Medical Providers:  Patient Care Team:  Sagar Cid MD as PCP - General (Internal Medicine)  Shyam Gallardo MD as Consulting Physician (General Surgery)    Outpatient Medications Prior to Visit   Medication Sig Dispense Refill   • albuterol sulfate  (90 Base) MCG/ACT inhaler Inhale 2 puffs Every 4 (Four) Hours As Needed for Wheezing. 1 inhaler 11   • amLODIPine (NORVASC) 5 MG tablet TAKE 1 TABLET BY MOUTH ONCE DAILY 90 tablet 3   • aspirin 325 MG tablet Take 1 tablet by mouth daily.     • escitalopram (LEXAPRO) 10 MG tablet TAKE 1 TABLET BY MOUTH ONCE DAILY 90 tablet 3   • isosorbide mononitrate (IMDUR) 30 MG 24 hr tablet TAKE 1 TABLET BY MOUTH ONCE DAILY 90 tablet 3   • metoprolol succinate XL (TOPROL-XL) 50 MG 24 hr tablet TAKE 1 TABLET BY MOUTH ONCE DAILY 90 tablet 3   • nitroglycerin (NITROSTAT) 0.4 MG SL tablet Place 1 tablet under the tongue Every 5 (Five) Minutes As Needed for Chest Pain. Take no more than 3 doses in 15 minutes. 25 tablet 11   • omeprazole (priLOSEC) 20 MG capsule TAKE 1 CAPSULE BY MOUTH  DAILY 90 capsule 3   • simvastatin (ZOCOR) 80 MG tablet TAKE 1 TABLET BY MOUTH EVERY EVENING 90 tablet 3   • temazepam (RESTORIL) 15 MG capsule Take 1 capsule by mouth At Night As Needed for Sleep. 30 capsule 0     No facility-administered medications prior to visit.       No opioid medication identified on active medication list. I have reviewed chart for other potential  high risk medication/s and harmful drug interactions in the elderly.          Aspirin is on active medication list. Aspirin use is indicated based on review of current medical condition/s. Pros and cons of this therapy have been discussed today. Benefits of this medication outweigh potential harm.  Patient has been encouraged to continue taking this medication.  .      Patient Active Problem List   Diagnosis   • Anxiety   • Multiple vessel coronary artery disease   • Hyperlipidemia LDL goal <70   • Gastroesophageal reflux disease without esophagitis   • Essential hypertension   • Emphysema/COPD (HCC)   • Inflammatory liver disease   • Multiple pulmonary nodules   • Malignant neoplasm of skin   • Tobacco use   • Diarrhea   • Family history of colon cancer   • Polyp of colon     Advance Care Planning  Advance Directive is not on file.  ACP discussion was held with the patient during this visit. Patient has an advance directive (not in EMR), copy requested.    Review of Systems   Constitutional: Negative for activity change, appetite change, fatigue and fever.   HENT: Negative for congestion, ear discharge, ear pain and trouble swallowing.    Eyes: Negative for photophobia and visual disturbance.   Respiratory: Negative for cough and shortness of breath.    Cardiovascular: Negative for chest pain and palpitations.   Gastrointestinal: Negative for abdominal distention, constipation, diarrhea, nausea and vomiting.   Genitourinary: Negative for dysuria, hematuria and urgency.   Musculoskeletal: Positive for arthralgias and back pain. Negative for joint  "swelling and myalgias.   Skin: Negative for color change and rash.   Neurological: Negative for dizziness, weakness, light-headedness and confusion.   Hematological: Negative for adenopathy. Does not bruise/bleed easily.   Psychiatric/Behavioral: Negative for agitation and dysphoric mood. The patient is not nervous/anxious.         Objective    Vitals:    02/18/22 1304   BP: 120/70   Pulse: 71   Temp: 97.3 °F (36.3 °C)   TempSrc: Infrared   SpO2: 97%   Weight: 80.6 kg (177 lb 12.8 oz)   Height: 180.3 cm (71\")   PainSc: 0-No pain     BMI Readings from Last 1 Encounters:   02/18/22 24.80 kg/m²   BMI is within normal parameters. No follow-up required.    Does the patient have evidence of cognitive impairment? No    Physical Exam  Constitutional:       General: He is not in acute distress.     Appearance: He is well-developed.   HENT:      Nose: Nose normal.   Eyes:      General: No scleral icterus.     Conjunctiva/sclera: Conjunctivae normal.   Neck:      Thyroid: No thyromegaly.      Trachea: No tracheal deviation.   Cardiovascular:      Rate and Rhythm: Normal rate and regular rhythm.      Heart sounds: No murmur heard.  No friction rub.   Pulmonary:      Effort: No respiratory distress.      Breath sounds: No wheezing or rales.   Abdominal:      General: There is no distension.      Palpations: Abdomen is soft. There is no mass.      Tenderness: There is no abdominal tenderness. There is no guarding.   Musculoskeletal:         General: Deformity present. Normal range of motion.   Lymphadenopathy:      Cervical: No cervical adenopathy.   Skin:     General: Skin is warm and dry.      Findings: No erythema or rash.   Neurological:      Mental Status: He is alert and oriented to person, place, and time.      Cranial Nerves: No cranial nerve deficit.      Coordination: Coordination normal.      Deep Tendon Reflexes: Reflexes are normal and symmetric.   Psychiatric:         Behavior: Behavior normal.         Thought " Content: Thought content normal.         Judgment: Judgment normal.       Lab Results   Component Value Date    TRIG 146 11/30/2021            HEALTH RISK ASSESSMENT    Smoking Status:  Social History     Tobacco Use   Smoking Status Current Every Day Smoker   • Packs/day: 1.50   • Types: Cigarettes   • Start date: 1976   Smokeless Tobacco Former User   • Types: Snuff   • Quit date: 2017     Alcohol Consumption:  Social History     Substance and Sexual Activity   Alcohol Use Yes   • Alcohol/week: 24.0 standard drinks   • Types: 24 Cans of beer per week    Comment: weekly     Fall Risk Screen:    CHARLES Fall Risk Assessment has not been completed.    Depression Screening:  PHQ-2/PHQ-9 Depression Screening 2/18/2022   Little interest or pleasure in doing things 0   Feeling down, depressed, or hopeless 0   Total Score 0       Health Habits and Functional and Cognitive Screening:  Functional & Cognitive Status 2/18/2022   Do you have difficulty preparing food and eating? No   Do you have difficulty bathing yourself, getting dressed or grooming yourself? No   Do you have difficulty using the toilet? No   Do you have difficulty moving around from place to place? No   Do you have trouble with steps or getting out of a bed or a chair? No   Current Diet Well Balanced Diet   Dental Exam Up to date        Dental Exam Comment -   Eye Exam Up to date   Exercise (times per week) 0 times per week   Current Exercises Include No Regular Exercise   Current Exercise Activities Include -   Do you need help using the phone?  No   Are you deaf or do you have serious difficulty hearing?  No   Do you need help with transportation? No   Do you need help shopping? No   Do you need help preparing meals?  No   Do you need help with housework?  No   Do you need help with laundry? No   Do you need help taking your medications? No   Do you need help managing money? No   Do you ever drive or ride in a car without wearing a seat belt? No   Have you  felt unusual stress, anger or loneliness in the last month? No   Who do you live with? Spouse   If you need help, do you have trouble finding someone available to you? No   Have you been bothered in the last four weeks by sexual problems? No   Do you have difficulty concentrating, remembering or making decisions? No       Age-appropriate Screening Schedule:  Refer to the list below for future screening recommendations based on patient's age, sex and/or medical conditions. Orders for these recommended tests are listed in the plan section. The patient has been provided with a written plan.    Health Maintenance   Topic Date Due   • TDAP/TD VACCINES (1 - Tdap) Never done   • ZOSTER VACCINE (1 of 2) 10/10/2029 (Originally 6/14/2010)   • LIPID PANEL  11/30/2022   • INFLUENZA VACCINE  Completed              Assessment/Plan   CMS Preventative Services Quick Reference  Risk Factors Identified During Encounter  Chronic Pain   Tobacco Use/Dependance (use dotphrase .tobaccocessation for documentation)  The above risks/problems have been discussed with the patient.  Follow up actions/plans if indicated are seen below in the Assessment/Plan Section.  Pertinent information has been shared with the patient in the After Visit Summary.    Diagnoses and all orders for this visit:    1. Essential hypertension (Primary) stable with current meds and low-salt diet    2. Multiple vessel coronary artery disease stable angina free recent CT scan of his abdomen showed calcification and atherosclerotic vascular disease along with possible renal artery stenosis.  Has not had uncontrolled hypertension or history of flash pulmonary edema.  CT abdomen reviewed findings discussed with patient.  Call placed to nephrologist.  Patient does not appear to be a candidate for evaluation for renal artery stenosis with angiogram.  Counseled about smoking cessation    3. Panlobular emphysema (HCC) albuterol as needed        Follow Up:   No follow-ups on  file.     An After Visit Summary and PPPS were made available to the patient.

## 2022-02-19 LAB — SARS-COV-2 RNA NOSE QL NAA+PROBE: NOT DETECTED

## 2022-04-13 RX ORDER — AMLODIPINE BESYLATE 5 MG/1
TABLET ORAL
Qty: 90 TABLET | Refills: 3 | Status: SHIPPED | OUTPATIENT
Start: 2022-04-13

## 2022-06-02 PROCEDURE — U0004 COV-19 TEST NON-CDC HGH THRU: HCPCS | Performed by: PHYSICIAN ASSISTANT

## 2022-08-29 NOTE — PROGRESS NOTES
Chief Complaint   Patient presents with   • Colon Cancer Screening   • Diarrhea   • Weight Loss     The patient has lost about 12 pounds over the past year. This is described as unintentional. He has had decreased appetite. He does not eat very much at one time. There is no history of nausea or vomiting.     There is a long standing history of diarrhea. The patient has 2-6 bowel movements per day. Stools are described as loose. If he drinks alcohol, diarrhea is worse. The patient is not taking anything for diarrhea. There is no history of bright red blood per rectum or melena. The patient denies abdominal pain.     There is a long standing history of heartburn. The patient takes Omeprazole with reasonable control of heartburn. Heartburn is mild. Reflux is not worse at night. There is no history of difficulty swallowing.     The patient's last colonoscopy was in 2016. There is a family history of colon cancer in the patient's brother and sister.    Diarrhea    This is a chronic problem. Episode onset: over 15 years ago. Episode frequency: 2-6 times per day. The problem has been unchanged. Diarrhea characteristics: loose. The patient states that diarrhea does not awaken him from sleep. Associated symptoms include arthralgias, coughing, headaches, myalgias and weight loss. Pertinent negatives include no abdominal pain, chills, fever or vomiting. Exacerbated by: alcohol. There are no known risk factors. He has tried nothing for the symptoms.   Weight Loss   This is a chronic problem. The current episode started more than 1 year ago. The problem occurs intermittently. The problem has been gradually worsening. Associated symptoms include arthralgias, coughing, fatigue, headaches and myalgias. Pertinent negatives include no abdominal pain, chest pain, chills, fever, joint swelling, nausea, rash or vomiting. Nothing aggravates the symptoms. He has tried nothing for the symptoms.   Heartburn   He complains of coughing and  See Nurse Triage telephone encounter from 8/27/22.  Thank you  To Int Med Seq PSR pool       heartburn. He reports no abdominal pain, no chest pain or no nausea. This is a chronic problem. Episode onset: over 5 years ago. The problem occurs rarely. The problem has been unchanged. The heartburn duration is an hour. The heartburn is located in the substernum. The heartburn is of mild intensity. The heartburn does not wake him from sleep. Nothing aggravates the symptoms. Associated symptoms include fatigue and weight loss. There are no known risk factors. He has tried a PPI for the symptoms. The treatment provided significant relief.      Review of Systems   Constitutional: Positive for fatigue, unexpected weight change and weight loss. Negative for appetite change, chills and fever.   HENT: Negative for mouth sores, nosebleeds and trouble swallowing.    Eyes: Negative for discharge and redness.   Respiratory: Positive for cough. Negative for apnea and shortness of breath.    Cardiovascular: Negative for chest pain, palpitations and leg swelling.   Gastrointestinal: Positive for diarrhea and heartburn. Negative for abdominal distention, abdominal pain, anal bleeding, blood in stool, constipation, nausea and vomiting.   Endocrine: Negative for cold intolerance, heat intolerance and polydipsia.   Genitourinary: Negative for dysuria, hematuria and urgency.   Musculoskeletal: Positive for arthralgias and myalgias. Negative for joint swelling.   Skin: Negative for rash.   Allergic/Immunologic: Negative for food allergies and immunocompromised state.   Neurological: Positive for headaches. Negative for dizziness, seizures and syncope.   Hematological: Negative for adenopathy. Does not bruise/bleed easily.   Psychiatric/Behavioral: Negative for dysphoric mood. The patient is nervous/anxious. The patient is not hyperactive.      Patient Active Problem List   Diagnosis   • Anxiety   • Multiple vessel coronary artery disease   • Hyperlipidemia LDL goal <70   • Gastroesophageal reflux disease without esophagitis   •  Essential hypertension   • Emphysema/COPD (CMS/HCC)   • Inflammatory liver disease   • Multiple pulmonary nodules   • Malignant neoplasm of skin   • Tobacco use   • Diarrhea   • Weight loss   • Heartburn   • Family history of colon cancer     Past Medical History:   Diagnosis Date   • Anxiety    • Arthritis    • Atypical chest pain 3/9/2017   • CAD (coronary artery disease) 2006   • CAD, multiple vessel      History of cardiac stenting to the RCA September 2006 with subsequent cardiac catheterizations in 2010 and 2012 reporting minimal disease with patent stent.   • Concussion 2019   • COPD (chronic obstructive pulmonary disease) (CMS/HCC) 2010      Moderate obstruction with previous improvement on Spiriva;  Long-standing history of tobacco abuse; Bullous emphysema noted on CT scan of the chest with history of prior spontaneous pneumothorax.   • Dyslipidemia    • Emphysema of lung (CMS/HCC)    • Fatigue    • GERD (gastroesophageal reflux disease)    • Hydropneumothorax      CT scan of the chest 06/17/2014, reports A. probable chronic anterior left chest hydropneumothorax.   • Hyperlipidemia 2006   • Hypertension 2000   • Hypertriglyceridemia 2006   • Leg pain     Normal KIMMY, 09/13/2006, at [facility].     • Myocardial infarction (CMS/HCC) 2006   • Panic attack 2010   • Pulmonary nodules      CT scan of the chest 06/17/2014 reports scattered 2-3 mm noncalcified nodules in the right middle lobe and left lower lobe, previous CT scan of the chest 09/16/2011 reported multiple 2 mm right middle lobe nodules.   • Renal calculi    • RLS (restless legs syndrome) 2010   • Skin cancer    • Spontaneous pneumothorax     Secondary to bullous emphysema, x2 in 1997.     Past Surgical History:   Procedure Laterality Date   • CARDIAC CATHETERIZATION      Catheterization 12/21/2012 reports noncritical coronary artery disease and a widely patent right coronary artery stent.   • CARDIAC CATHETERIZATION N/A 5/26/2017     Procedure: Left Heart Cath;  Surgeon: Anai Sweet MD;  Location: [facility] CATH INVASIVE LOCATION;  Service:    • CARDIAC CATHETERIZATION  02/2018    Male clinic in [facility]   • CHEST TUBE INSERTION  1996, 1997   • COLONOSCOPY  2016   • CORONARY STENT PLACEMENT  2006   • ENDOSCOPY     • INGUINAL HERNIA REPAIR  2009   • POLYPECTOMY  2006    Status post polypectomy x2, noncancerous, spring 2006.   • UPPER GASTROINTESTINAL ENDOSCOPY  2016     Family History   Problem Relation Age of Onset   • Heart disease Mother    • Diabetes Father    • Heart disease Father    • Cancer Sister         mass in lower intestine   • Colon cancer Sister 58   • Heart disease Brother    • Liver disease Brother    • Cancer Brother         colon   • Cirrhosis Brother    • Colon cancer Brother    • Crohn's disease Cousin    • Liver cancer Neg Hx    • Ulcerative colitis Neg Hx    • Esophageal cancer Neg Hx    • Stomach cancer Neg Hx      Social History     Tobacco Use   • Smoking status: Current Every Day Smoker     Packs/day: 1.50     Types: Cigarettes     Start date: 1976   • Smokeless tobacco: Former User     Types: Snuff     Quit date: 2017   Substance Use Topics   • Alcohol use: Yes     Alcohol/week: 14.4 oz     Types: 24 Cans of beer per week     Comment: weekly       Current Outpatient Medications:   •  amLODIPine (NORVASC) 5 MG tablet, TAKE ONE TABLET BY MOUTH ONCE DAILY, Disp: 90 tablet, Rfl: 1  •  aspirin 325 MG tablet, Take 1 tablet by mouth daily., Disp: , Rfl:   •  escitalopram (LEXAPRO) 10 MG tablet, Take 1 tablet by mouth Daily., Disp: 90 tablet, Rfl: 3  •  isosorbide mononitrate (IMDUR) 30 MG 24 hr tablet, take 1 tablet by mouth once daily, Disp: 30 tablet, Rfl: 0  •  metoprolol succinate XL (TOPROL-XL) 50 MG 24 hr tablet, take 1 tablet by mouth once daily, Disp: 90 tablet, Rfl: 3  •  nitroglycerin (NITROSTAT) 0.4 MG SL tablet, take 1 tablet by mouth EVERY 5 MINUTES if needed for chest pain, Disp: 25 tablet,  "Rfl: 4  •  omeprazole (priLOSEC) 20 MG capsule, Take 1 capsule by mouth Daily. Must keep f/u appt for further refills, Disp: 30 capsule, Rfl: 0  •  simvastatin (ZOCOR) 80 MG tablet, Take 1 tablet by mouth Daily. Please call to reschedule missed appt for further refills, Disp: 30 tablet, Rfl: 0  •  temazepam (RESTORIL) 15 MG capsule, Take 1 capsule by mouth At Night As Needed for Sleep., Disp: 30 capsule, Rfl: 0  •  TiZANidine (ZANAFLEX) 4 MG capsule, Take 1 capsule by mouth 3 (Three) Times a Day As Needed for Muscle Spasms., Disp: 15 capsule, Rfl: 0  •  varenicline (CHANTIX CONTINUING MONTH HOUSTON) 1 MG tablet, Take 1 tablet by mouth 2 (Two) Times a Day., Disp: 180 tablet, Rfl: 0  •  varenicline (CHANTIX STARTING MONTH HOUSTON) 0.5 MG X 11 & 1 MG X 42 tablet, Take 0.5 mg one daily on days 1-3 and and 0.5 mg twice daily on days 4-7.Then 1 mg twice daily for a total of 12 weeks., Disp: 42 tablet, Rfl: 0  •  albuterol (PROVENTIL HFA;VENTOLIN HFA) 108 (90 Base) MCG/ACT inhaler, Inhale 2 puffs Every 4 (Four) Hours As Needed for Wheezing., Disp: 1 inhaler, Rfl: 11    No Known Allergies    Blood pressure 129/76, pulse 75, temperature 98 °F (36.7 °C), height 180.3 cm (71\"), weight 81.6 kg (180 lb).    Physical Exam   Constitutional: He is oriented to person, place, and time. He appears well-developed and well-nourished. No distress.   HENT:   Head: Normocephalic and atraumatic.   Right Ear: Hearing and external ear normal.   Left Ear: Hearing and external ear normal.   Nose: Nose normal.   Mouth/Throat: Oropharynx is clear and moist and mucous membranes are normal. Mucous membranes are not pale, not dry and not cyanotic. No oral lesions. No oropharyngeal exudate.   Eyes: Conjunctivae and EOM are normal. Right eye exhibits no discharge. Left eye exhibits no discharge.   Neck: Trachea normal. Neck supple. No JVD present. No edema present. No thyroid mass and no thyromegaly present.   Cardiovascular: Normal rate, regular rhythm, S2 " normal and normal heart sounds. Exam reveals no gallop, no S3 and no friction rub.   No murmur heard.  Pulmonary/Chest: Effort normal and breath sounds normal. No respiratory distress. He exhibits no tenderness.   Abdominal: Normal appearance and bowel sounds are normal. He exhibits no distension, no ascites and no mass. There is no splenomegaly or hepatomegaly. There is no tenderness. There is no rigidity, no rebound and no guarding. No hernia.     Vascular Status -  His right foot exhibits no edema. His left foot exhibits no edema.  Lymphadenopathy:     He has no cervical adenopathy.        Left: No supraclavicular adenopathy present.   Neurological: He is alert and oriented to person, place, and time. He has normal strength. No cranial nerve deficit or sensory deficit.   Skin: No rash noted. He is not diaphoretic. No cyanosis. No pallor. Nails show no clubbing.   Psychiatric: He has a normal mood and affect.   Nursing note and vitals reviewed.  Stigmata of chronic liver disease:  None.  Asterixis:  None.    Laboratory Tests:   Upon review of records:    Dated 10/22/2019 glucose 131 BUN 5 creatinine 0.67 sodium 139 potassium 3.8 chloride 105 CO2 22.3 calcium 9.2 albumin 3.9 ALT 34 AST 49 alkaline phosphatase 77 total bilirubin 0.8 WBC 6.36 hemoglobin 15.2 hematocrit 43.8 platelet count 227 MCV 94.8    Procedures:  Upon review of records:    Colonoscopy per Dr. Pat Dye dated 6/21/2016 reveals colon polyps.  Ascending colon polyp biopsy reveals tubular adenoma.  Mid sigmoid colon polyp polypectomy reveals tubular adenoma.  Completely excised.  Rectosigmoid colon polyp biopsy reveals tubular adenoma.    Assessment:      ICD-10-CM ICD-9-CM   1. Diarrhea, unspecified type R19.7 787.91   2. Weight loss R63.4 783.21   3. Heartburn R12 787.1   4. Colon cancer screening Z12.11 V76.51   5. Family history of colon cancer Z80.0 V16.0       Plan/  Patient Instructions   1. Antireflux measures: Avoid fried, fatty foods,  alcohol, chocolate, coffee, tea,  soft drinks, peppermint and spearmint, spicy foods, tomatoes and tomato based foods, onion based foods, and smoking. Other antireflux measures include weight reduction if overweight, avoiding tight clothing around the abdomen, elevating the head of the bed 6 inches with blocks under the head board, and don't drink or eat before going to bed and avoid lying down immediately after meals.  2. Omeprazole 20 mg 1 by mouth in the am 30 minutes before breakfast.  3. Upper endoscopy-EGD: Description of the procedure, risks, benefits, alternatives and options, including nonoperative options, were discussed with the patient in detail. The patient understands and wishes to proceed.  4. Colonoscopy: Description of the procedure, risks, benefits, alternatives and options, including nonoperative options, were discussed with the patient in detail. The patient understands and wishes to proceed.  5. The patient wishes to schedule in April 2019 as he is going to [place] for the winter in the next week or two. He will call the office if any medical changes prior to procedures.     Chucho Lopez APRN

## 2022-10-06 RX ORDER — SIMVASTATIN 80 MG
80 TABLET ORAL EVERY EVENING
Qty: 90 TABLET | Refills: 3 | Status: SHIPPED | OUTPATIENT
Start: 2022-10-06

## 2022-12-06 ENCOUNTER — TELEPHONE (OUTPATIENT)
Dept: CARDIOLOGY | Facility: CLINIC | Age: 62
End: 2022-12-06

## 2022-12-08 NOTE — TELEPHONE ENCOUNTER
PT called to let us know that he had to cancel his upcoming appt due to being in [place] with his wife who is battling cancer.

## 2022-12-31 DIAGNOSIS — I10 ESSENTIAL HYPERTENSION: ICD-10-CM

## 2023-01-03 RX ORDER — ESCITALOPRAM OXALATE 10 MG/1
TABLET ORAL
Qty: 90 TABLET | Refills: 3 | Status: SHIPPED | OUTPATIENT
Start: 2023-01-03

## 2023-01-03 RX ORDER — ISOSORBIDE MONONITRATE 30 MG/1
TABLET, EXTENDED RELEASE ORAL
Qty: 90 TABLET | Refills: 3 | Status: SHIPPED | OUTPATIENT
Start: 2023-01-03

## 2023-01-12 ENCOUNTER — TELEPHONE (OUTPATIENT)
Dept: INTERNAL MEDICINE | Facility: CLINIC | Age: 63
End: 2023-01-12
Payer: MEDICARE

## 2023-01-12 NOTE — TELEPHONE ENCOUNTER
Spoke with patient, states he has been having elevated B/P for a couple days.  Last b/p reading was last pm of 180/110.  Advised patient he needs to be seen by a MD in [place] to address as he is not sure when he will return to [place].

## 2023-01-12 NOTE — TELEPHONE ENCOUNTER
Caller: Johnny Crow    Relationship: Self    Best call back number:  [phone]    Who are you requesting to speak with (clinical staff, provider,  specific staff member):  CLINICAL, NURSE     What was the call regarding: MEDICATIONS AND SOME ISSUES HE HAD YESTERDAY  HE IS IN [place] RIGHT NOW      Do you require a callback:  YES

## 2023-01-13 ENCOUNTER — TELEPHONE (OUTPATIENT)
Dept: CARDIOLOGY | Facility: CLINIC | Age: 63
End: 2023-01-13
Payer: MEDICARE

## 2023-01-13 RX ORDER — METOPROLOL SUCCINATE 50 MG/1
50 TABLET, EXTENDED RELEASE ORAL DAILY
Qty: 90 TABLET | Refills: 0 | Status: SHIPPED | OUTPATIENT
Start: 2023-01-13 | End: 2023-02-13 | Stop reason: SDUPTHER

## 2023-01-13 NOTE — TELEPHONE ENCOUNTER
PT reports that he has been having issues with his BP lately being very elevated. He is in [place] right now and is not sure when he or if he will be returning to [place] (per our last conversation). He has asked if he can take an additional Norvasc 5 mg daily for episodes of HTN - explained that will be ok to do but that he needs to establish with a GP in the [place] area to follow up with, especially since his BP is continuing to be elevated

## 2023-01-25 ENCOUNTER — TELEPHONE (OUTPATIENT)
Dept: INTERNAL MEDICINE | Facility: CLINIC | Age: 63
End: 2023-01-25
Payer: MEDICARE

## 2023-01-25 RX ORDER — OMEPRAZOLE 20 MG/1
20 CAPSULE, DELAYED RELEASE ORAL DAILY
Qty: 90 CAPSULE | Refills: 3 | Status: SHIPPED | OUTPATIENT
Start: 2023-01-25

## 2023-01-25 NOTE — TELEPHONE ENCOUNTER
I called and spoke with patient who is in [place] due to his spouse getting cancer treatment. Patient states he apologizes for missing his last appointment. Patient has had a cough and wanting to know if Dr. Cid would order him a chest xray and blood work to have done in [place]?

## 2023-01-25 NOTE — TELEPHONE ENCOUNTER
Caller: Johnny Crow    Relationship: Self    Best call back number: [phone]    What is the best time to reach you: ASAP    Who are you requesting to speak with (clinical staff, provider,  specific staff member):NURSE    What was the call regarding: REQUESTING A CALL BACK ASAP. PATIENT DID NOT CARE TO SPECIFY    Do you require a callback: YES

## 2023-02-13 RX ORDER — METOPROLOL SUCCINATE 50 MG/1
50 TABLET, EXTENDED RELEASE ORAL DAILY
Qty: 90 TABLET | Refills: 3 | Status: SHIPPED | OUTPATIENT
Start: 2023-02-13

## 2023-02-13 NOTE — TELEPHONE ENCOUNTER
Caller: Johnny Crow    Relationship: Self    Best call back number: [phone]    Requested Prescriptions:   Requested Prescriptions     Pending Prescriptions Disp Refills   • metoprolol succinate XL (TOPROL-XL) 50 MG 24 hr tablet 90 tablet 0     Sig: Take 1 tablet by mouth Daily.       nitroglycerin (NITROSTAT) 0.4 MG SL tablet         Pharmacy where request should be sent: [id number] - [place] [id number] [address] AT [facility] OF [facility] [phone] [facility] [phone] FX     Additional details provided by patient: JOHNNY'S WIFE HAS CANCER AND THEY ARE IN [place].  HE IS CARING FOR HER.  HIS CARDIOLOGIST IN [place]  WANTS TO SEE JOHNNY BEFORE SHE WILL FILL THESE.  CAN YOU FILL THESE?    HE HAS 4 PILLS LEFT    Does the patient have less than a 3 day supply:  [x] Yes  [] No    Would you like a call back once the refill request has been completed: [x] Yes [] No    If the office needs to give you a call back, can they leave a voicemail: [] Yes [] No    Fito Martinez Rep   02/13/23 13:27 EST

## 2023-06-01 RX ORDER — AMLODIPINE BESYLATE 5 MG/1
5 TABLET ORAL DAILY
Qty: 30 TABLET | Refills: 0 | Status: SHIPPED | OUTPATIENT
Start: 2023-06-01

## 2023-06-01 RX ORDER — AMLODIPINE BESYLATE 5 MG/1
5 TABLET ORAL DAILY
Qty: 90 TABLET | OUTPATIENT
Start: 2023-06-01

## 2023-06-01 NOTE — TELEPHONE ENCOUNTER
Rx Refill Note  Requested Prescriptions     Pending Prescriptions Disp Refills   • amLODIPine (NORVASC) 5 MG tablet 90 tablet 3     Sig: Take 1 tablet by mouth Daily.      Last office visit with prescribing clinician: 2/18/2022   Last telemedicine visit with prescribing clinician: Visit date not found   Next office visit with prescribing clinician: Visit date not found   Last lab-2021

## 2023-06-01 NOTE — TELEPHONE ENCOUNTER
"Caller: Johnny Crow IRINEO \"FLORINDA\"    Relationship: Self    Best call back number: [phone]     Requested Prescriptions:   Requested Prescriptions     Pending Prescriptions Disp Refills   • amLODIPine (NORVASC) 5 MG tablet 90 tablet 3     Sig: Take 1 tablet by mouth Daily.        Pharmacy where request should be sent: [facility] #[id number] [place] [address] AT [facility] OF [facility] - [phone] [facility] [phone] FX     Last office visit with prescribing clinician: 2/18/2022   Last telemedicine visit with prescribing clinician: Visit date not found   Next office visit with prescribing clinician: Visit date not found     Additional details provided by patient: PATIENT IS OUT OF MEDICATION    Does the patient have less than a 3 day supply:  [x] Yes  [] No    Would you like a call back once the refill request has been completed: [x] Yes [] No    If the office needs to give you a call back, can they leave a voicemail: [x] Yes [] No    Fito Butler   06/01/23 11:48 EDT       "

## 2023-08-22 RX ORDER — SIMVASTATIN 80 MG
80 TABLET ORAL EVERY EVENING
Qty: 90 TABLET | Refills: 3 | OUTPATIENT
Start: 2023-08-22

## 2023-10-10 RX ORDER — AMLODIPINE BESYLATE 5 MG/1
5 TABLET ORAL DAILY
Qty: 15 TABLET | Refills: 0 | Status: SHIPPED | OUTPATIENT
Start: 2023-10-10

## 2023-10-10 NOTE — TELEPHONE ENCOUNTER
Rx Refill Note  Requested Prescriptions     Pending Prescriptions Disp Refills    amLODIPine (NORVASC) 5 MG tablet [Pharmacy Med Name: AMLODIPINE BESYLATE 5MG TABLETS] 90 tablet 0     Sig: TAKE 1 TABLET BY MOUTH DAILY      Last office visit with prescribing clinician: 2/18/2022   Last telemedicine visit with prescribing clinician: Visit date not found   Next office visit with prescribing clinician: Visit date not     Maury Kennedy MA  10/10/23, 17:03 EDT

## 2023-10-16 DIAGNOSIS — I10 ESSENTIAL HYPERTENSION: Primary | ICD-10-CM

## 2023-10-16 NOTE — TELEPHONE ENCOUNTER
"  Caller: Johnny Crow \"FLORINDA\"    Relationship: Self    Best call back number: [phone]     Requested Prescriptions:   Requested Prescriptions     Pending Prescriptions Disp Refills    amLODIPine (NORVASC) 5 MG tablet 15 tablet 0     Sig: Take 1 tablet by mouth Daily.        Pharmacy where request should be sent: [facility] #[id number] [address] [place] [address] - [phone] [facility] [phone]      Last office visit with prescribing clinician: 2/18/2022   Last telemedicine visit with prescribing clinician: Visit date not found   Next office visit with prescribing clinician: Visit date not found     Additional details provided by patient: PATIENT OUT OF MEDICATION    Does the patient have less than a 3 day supply:  [x] Yes  [] No    Would you like a call back once the refill request has been completed: [] Yes [x] No    If the office needs to give you a call back, can they leave a voicemail: [] Yes [x] No    Corrie Brown   10/16/23 15:39 EDT     "

## 2023-10-17 RX ORDER — AMLODIPINE BESYLATE 5 MG/1
5 TABLET ORAL DAILY
Qty: 30 TABLET | Refills: 0 | Status: SHIPPED | OUTPATIENT
Start: 2023-10-17

## 2023-10-17 NOTE — TELEPHONE ENCOUNTER
S/w patient to schedule him for his AWV but patient is currently in [place] with his wife (wife is sick and seeing providers down there). He states that they have pretty much moved down there.     Sent in a short supply of refills.   Asking Dr. Cid if it is okay to schedule a video visit with patient.

## 2023-11-22 RX ORDER — ESCITALOPRAM OXALATE 10 MG/1
TABLET ORAL
Qty: 90 TABLET | Refills: 0 | Status: SHIPPED | OUTPATIENT
Start: 2023-11-22

## 2023-11-22 RX ORDER — OMEPRAZOLE 20 MG/1
20 CAPSULE, DELAYED RELEASE ORAL DAILY
Qty: 90 CAPSULE | Refills: 0 | Status: SHIPPED | OUTPATIENT
Start: 2023-11-22

## 2023-11-22 NOTE — TELEPHONE ENCOUNTER
Rx Refill Note  Requested Prescriptions     Pending Prescriptions Disp Refills    omeprazole (priLOSEC) 20 MG capsule [Pharmacy Med Name: OMEPRAZOLE 20MG CAPSULES] 90 capsule 3     Sig: TAKE 1 CAPSULE BY MOUTH DAILY    escitalopram (LEXAPRO) 10 MG tablet [Pharmacy Med Name: ESCITALOPRAM 10MG TABLETS] 90 tablet 3     Sig: TAKE 1 TABLET BY MOUTH EVERY DAY      Last office visit with prescribing clinician: 2/18/2022   Last telemedicine visit with prescribing clinician: Visit date not found   Next office visit with prescribing clinician: Visit date not found       Maury Kennedy MA  11/22/23, 10:46 EST

## 2023-11-22 NOTE — TELEPHONE ENCOUNTER
Attempted to contact patient to set up appointment, no answer. VM is full. Sent 90 day supply no refills, and note to pharmacy he needs an appointment for further refills.

## 2023-12-13 RX ORDER — SIMVASTATIN 80 MG
80 TABLET ORAL EVERY EVENING
Qty: 90 TABLET | Refills: 3 | Status: SHIPPED | OUTPATIENT
Start: 2023-12-13

## 2023-12-14 DIAGNOSIS — I10 ESSENTIAL HYPERTENSION: ICD-10-CM

## 2023-12-14 RX ORDER — ISOSORBIDE MONONITRATE 30 MG/1
TABLET, EXTENDED RELEASE ORAL
Qty: 90 TABLET | Refills: 3 | OUTPATIENT
Start: 2023-12-14

## 2024-09-14 RX ORDER — SIMVASTATIN 80 MG
80 TABLET ORAL EVERY EVENING
Qty: 90 TABLET | Refills: 3 | Status: SHIPPED | OUTPATIENT
Start: 2024-09-14

## (undated) DEVICE — MINI TREK CORONARY DILATATION CATHETER 2.0 MM X 15 MM / RAPID-EXCHANGE: Brand: MINI TREK

## (undated) DEVICE — DEV COMP RAD PRELUDESYNC 24CM

## (undated) DEVICE — GW TPR/CORE CERBRL HEP HN .035 15X260

## (undated) DEVICE — CATH DIAG EXPO M/ PK 6FR FL4/FR4 PIG 3PK

## (undated) DEVICE — DEV INFL MONARCH 25W

## (undated) DEVICE — NC TREK CORONARY DILATATION CATHETER 2.5 MM X 15 MM / RAPID-EXCHANGE: Brand: NC TREK

## (undated) DEVICE — PK CATH CARD 10

## (undated) DEVICE — ENDOSCOPY PORT CONNECTOR FOR OLYMPUS® SCOPES: Brand: ERBE

## (undated) DEVICE — PAD GRND REM POLYHESIVE A/ DISP

## (undated) DEVICE — GUIDE CATHETER: Brand: MACH1™

## (undated) DEVICE — CANNULA,ADULT,SOFT-TOUCH,7'TUBE,UC: Brand: PENDING

## (undated) DEVICE — HYBRID TUBING/CAP SET FOR OLYMPUS® SCOPES: Brand: ERBE

## (undated) DEVICE — CATH DIAG EXPO .056 FL3.5 6F 100CM

## (undated) DEVICE — GW PT 2 MS .014 185CM STR TP

## (undated) DEVICE — Device: Brand: ASAHI SION BLUE

## (undated) DEVICE — RADIFOCUS GLIDEWIRE: Brand: GLIDEWIRE

## (undated) DEVICE — Device

## (undated) DEVICE — GLIDESHEATH SLENDER STAINLESS STEEL KIT: Brand: GLIDESHEATH SLENDER

## (undated) DEVICE — KODAMA CATHETER, P/N 701470CONTENTS: IMAGING CATHETER, 3 ML SYRINGE, 10 ML SYRINGE, EXTENSION SET, STERILE BAG, IFU: Brand: ACIST KODAMA® CORONARY IMAGING CATHETER

## (undated) DEVICE — HI-TORQUE PILOT 150 GUIDE WIRE .014 STRAIGHT TIP 3.0 CM X 190 CM: Brand: HI-TORQUE PILOT

## (undated) DEVICE — VLV SXN AIR/H2O ORCAPOD3 1P/U STRL

## (undated) DEVICE — KT MANIFOLD CATHLAB CUST

## (undated) DEVICE — LUBE JELLY PK/2.75GM STRL BX/144